# Patient Record
Sex: MALE | Race: WHITE | NOT HISPANIC OR LATINO | Employment: FULL TIME | URBAN - METROPOLITAN AREA
[De-identification: names, ages, dates, MRNs, and addresses within clinical notes are randomized per-mention and may not be internally consistent; named-entity substitution may affect disease eponyms.]

---

## 2017-12-13 ENCOUNTER — OFFICE VISIT (OUTPATIENT)
Dept: FAMILY MEDICINE CLINIC | Facility: CLINIC | Age: 32
End: 2017-12-13

## 2017-12-13 VITALS
HEIGHT: 68 IN | DIASTOLIC BLOOD PRESSURE: 70 MMHG | SYSTOLIC BLOOD PRESSURE: 105 MMHG | OXYGEN SATURATION: 98 % | BODY MASS INDEX: 36.93 KG/M2 | WEIGHT: 243.7 LBS | HEART RATE: 82 BPM

## 2017-12-13 DIAGNOSIS — E03.9 ACQUIRED HYPOTHYROIDISM: ICD-10-CM

## 2017-12-13 DIAGNOSIS — E78.5 HYPERLIPIDEMIA, UNSPECIFIED HYPERLIPIDEMIA TYPE: ICD-10-CM

## 2017-12-13 DIAGNOSIS — Z00.00 ANNUAL PHYSICAL EXAM: Primary | ICD-10-CM

## 2017-12-13 DIAGNOSIS — Z13.1 SCREENING FOR DIABETES MELLITUS: ICD-10-CM

## 2017-12-13 PROCEDURE — 99385 PREV VISIT NEW AGE 18-39: CPT | Performed by: FAMILY MEDICINE

## 2017-12-13 NOTE — PROGRESS NOTES
Subjective   Jadon Hanson Jr. is a 32 y.o. male. Patient is new here today for   Chief Complaint   Patient presents with   • Annual Exam     New to [facility]. Fasting.   • Hypothyroidism            Jadon Hanson Jr. presents for an Annual Wellness Visit.  he has a history of   Patient Active Problem List   Diagnosis   • Hypothyroidism   • Growth hormone deficiency   .      HPI     Hypothyroidism:  -diagnosed at age 15  -followed by Endocrinologist, Dr. Wilson  -ran out of thyroid medication about 1 month ago and hasn't been able to get to his usual pharmacy to  the refill due to car troubles  -feeling tired, gaining weight       Health Habits:  Dental Exam. not up to date - needed  Eye Exam. not up to date - pt does not wear corrective lenses  Exercise: 0 times/week.  Current exercise activities include: none   Would like to return to for fasting labs after he has resumed thyroid medication.    Pt declines STI screening.    Pt declines flu vaccine.    Tdap UTD.      The following portions of the patient's history were reviewed and updated as appropriate: allergies, current medications, past family history, past medical history, past social history, past surgical history and problem list.    Past Medical History:   Diagnosis Date   • Growth hormone deficiency    • Hyperlipidemia    • Hypothyroidism       Past Surgical History:   Procedure Laterality Date   • MOLE REMOVAL         No Known Allergies     Social History     Social History   • Marital status: Single     Social History Main Topics   • Smoking status: Never Smoker   • Smokeless tobacco: Never Used   • Alcohol use Yes      Comment: Socially, about 6-20 drinks per week.     • Drug use: No   • Sexual activity: Yes     Partners: Female     Social History Narrative    Lives with his brother but is planning to move in with his girlfriend soon.  Works as an .        Medications:  Nature thyroid 112 mg PO daily    Review of Systems    Constitutional: Positive for fatigue and unexpected weight change (gaining weight). Negative for appetite change and fever.   HENT: Negative for congestion, rhinorrhea and sore throat.    Eyes: Negative for pain and visual disturbance.   Respiratory: Negative for cough, chest tightness and shortness of breath.    Cardiovascular: Negative for chest pain, palpitations and leg swelling.   Gastrointestinal: Negative for abdominal pain, blood in stool, constipation, diarrhea, nausea and vomiting.   Genitourinary: Negative for discharge, genital sores and penile pain.   Musculoskeletal: Negative for arthralgias and myalgias.   Skin: Negative for pallor and rash.   Neurological: Negative for dizziness and headaches.   Hematological: Negative for adenopathy.   Psychiatric/Behavioral: Negative for dysphoric mood. The patient is not nervous/anxious.    All other systems reviewed and are negative.        Objective       Vitals:    12/13/17 1020   BP: 105/70   Pulse: 82   SpO2: 98%   BMI 37.8    Physical Exam   Constitutional: Vital signs are normal. He appears well-developed and well-nourished. No distress.   HENT:   Head: Normocephalic and atraumatic.   Nose: Nose normal.   Mouth/Throat: Oropharynx is clear and moist.   Eyes: Conjunctivae are normal. Pupils are equal, round, and reactive to light.   Neck: Normal range of motion. Neck supple. No thyromegaly present.   Cardiovascular: Normal rate, regular rhythm, S1 normal, S2 normal and normal heart sounds.  Exam reveals no gallop.    No murmur heard.  Pulses:       Radial pulses are 2+ on the right side, and 2+ on the left side.   Pulmonary/Chest: Effort normal and breath sounds normal. He has no wheezes. He has no rales. He exhibits no tenderness.   Abdominal: Soft. Bowel sounds are normal. He exhibits no distension. There is no hepatosplenomegaly. There is no tenderness. There is no rebound and no guarding.   Musculoskeletal: He exhibits no edema.   Lymphadenopathy:      He has no cervical adenopathy.   Neurological: He is alert. He has normal strength. Gait normal.   Skin: Skin is warm and dry. No cyanosis. Nails show no clubbing.   Psychiatric: He has a normal mood and affect. His speech is normal and behavior is normal.   Nursing note and vitals reviewed.      ASSESSMENT/PLAN       Problem List Items Addressed This Visit        Endocrine    Hypothyroidism    Relevant Medications    Refill Thyroid (THYROIDTHROID) 113.75 MG PO tablet, will send to nearby pharmacy so pt may walk to pick it up    Other Relevant Orders    TSH    T3, free    T4, free  Pt will return for fasting labs in 3 months  Continue to follow up with Endocrinology as scheduled      Other Visit Diagnoses     Annual physical exam    -  Primary    Relevant Medications    Thyroid (THYROIDTHROID) 113.75 MG PO tablet    Other Relevant Orders    Comprehensive Metabolic Panel    TSH    Lipid Panel With LDL / HDL Ratio    T3, free    T4, free  Pt advised to decrease ETOH consumption and increase exercise      Hyperlipidemia, unspecified hyperlipidemia type        Relevant Orders    Lipid Panel With LDL / HDL Ratio      Screening for diabetes mellitus        Relevant Orders    Comprehensive Metabolic Panel            Patient Instructions   Don't forget to schedule a dental exam soon.      Return to clinic for a fasting lab draw in 3 months' time.      Check your thyroid each month for nodules.    Try to exercise at least 3 times per week for 30 minutes per session.        Hypothyroidism  Hypothyroidism is a disorder of the thyroid. The thyroid is a large gland that is located in the lower front of the neck. The thyroid releases hormones that control how the body works. With hypothyroidism, the thyroid does not make enough of these hormones.  CAUSES  Causes of hypothyroidism may include:  · Viral infections.  · Pregnancy.  · Your own defense system (immune system) attacking your thyroid.  · Certain medicines.  · Birth  defects.  · Past radiation treatments to your head or neck.  · Past treatment with radioactive iodine.  · Past surgical removal of part or all of your thyroid.  · Problems with the gland that is located in the center of your brain (pituitary).  SIGNS AND SYMPTOMS  Signs and symptoms of hypothyroidism may include:  · Feeling as though you have no energy (lethargy).  · Inability to tolerate cold.  · Weight gain that is not explained by a change in diet or exercise habits.  · Dry skin.  · Coarse hair.  · Menstrual irregularity.  · Slowing of thought processes.  · Constipation.  · Sadness or depression.  DIAGNOSIS   Your health care provider may diagnose hypothyroidism with blood tests and ultrasound tests.  TREATMENT  Hypothyroidism is treated with medicine that replaces the hormones that your body does not make. After you begin treatment, it may take several weeks for symptoms to go away.  HOME CARE INSTRUCTIONS   · Take medicines only as directed by your health care provider.  · If you start taking any new medicines, tell your health care provider.  · Keep all follow-up visits as directed by your health care provider. This is important. As your condition improves, your dosage needs may change. You will need to have blood tests regularly so that your health care provider can watch your condition.  SEEK MEDICAL CARE IF:  · Your symptoms do not get better with treatment.  · You are taking thyroid replacement medicine and:    You sweat excessively.    You have tremors.    You feel anxious.    You lose weight rapidly.    You cannot tolerate heat.    You have emotional swings.    You have diarrhea.    You feel weak.  SEEK IMMEDIATE MEDICAL CARE IF:   · You develop chest pain.  · You develop an irregular heartbeat.  · You develop a rapid heartbeat.     This information is not intended to replace advice given to you by your health care provider. Make sure you discuss any questions you have with your health care provider.      Document Released: 12/18/2006 Document Revised: 01/08/2016 Document Reviewed: 05/05/2015  [employer] Interactive Patient Education ©2017 [employer]            Return in about 6 months (around 6/13/2018).

## 2017-12-13 NOTE — PATIENT INSTRUCTIONS
Don't forget to schedule a dental exam soon.      Return to clinic for a fasting lab draw in 3 months' time.      Check your thyroid each month for nodules.    Try to exercise at least 3 times per week for 30 minutes per session.        Hypothyroidism  Hypothyroidism is a disorder of the thyroid. The thyroid is a large gland that is located in the lower front of the neck. The thyroid releases hormones that control how the body works. With hypothyroidism, the thyroid does not make enough of these hormones.  CAUSES  Causes of hypothyroidism may include:  · Viral infections.  · Pregnancy.  · Your own defense system (immune system) attacking your thyroid.  · Certain medicines.  · Birth defects.  · Past radiation treatments to your head or neck.  · Past treatment with radioactive iodine.  · Past surgical removal of part or all of your thyroid.  · Problems with the gland that is located in the center of your brain (pituitary).  SIGNS AND SYMPTOMS  Signs and symptoms of hypothyroidism may include:  · Feeling as though you have no energy (lethargy).  · Inability to tolerate cold.  · Weight gain that is not explained by a change in diet or exercise habits.  · Dry skin.  · Coarse hair.  · Menstrual irregularity.  · Slowing of thought processes.  · Constipation.  · Sadness or depression.  DIAGNOSIS   Your health care provider may diagnose hypothyroidism with blood tests and ultrasound tests.  TREATMENT  Hypothyroidism is treated with medicine that replaces the hormones that your body does not make. After you begin treatment, it may take several weeks for symptoms to go away.  HOME CARE INSTRUCTIONS   · Take medicines only as directed by your health care provider.  · If you start taking any new medicines, tell your health care provider.  · Keep all follow-up visits as directed by your health care provider. This is important. As your condition improves, your dosage needs may change. You will need to have blood tests regularly so  that your health care provider can watch your condition.  SEEK MEDICAL CARE IF:  · Your symptoms do not get better with treatment.  · You are taking thyroid replacement medicine and:    You sweat excessively.    You have tremors.    You feel anxious.    You lose weight rapidly.    You cannot tolerate heat.    You have emotional swings.    You have diarrhea.    You feel weak.  SEEK IMMEDIATE MEDICAL CARE IF:   · You develop chest pain.  · You develop an irregular heartbeat.  · You develop a rapid heartbeat.     This information is not intended to replace advice given to you by your health care provider. Make sure you discuss any questions you have with your health care provider.     Document Released: 12/18/2006 Document Revised: 01/08/2016 Document Reviewed: 05/05/2015  [employer] Interactive Patient Education ©2017 [employer]

## 2018-01-19 DIAGNOSIS — Z00.00 ANNUAL PHYSICAL EXAM: ICD-10-CM

## 2018-01-22 RX ORDER — THYROID,PORK 113.75 MG
TABLET ORAL
Qty: 30 TABLET | Refills: 2 | Status: SHIPPED | OUTPATIENT
Start: 2018-01-22 | End: 2018-08-08 | Stop reason: SDUPTHER

## 2018-03-13 ENCOUNTER — LAB (OUTPATIENT)
Dept: FAMILY MEDICINE CLINIC | Facility: CLINIC | Age: 33
End: 2018-03-13

## 2018-03-13 DIAGNOSIS — Z00.00 ANNUAL PHYSICAL EXAM: ICD-10-CM

## 2018-03-13 DIAGNOSIS — E78.5 HYPERLIPIDEMIA, UNSPECIFIED HYPERLIPIDEMIA TYPE: ICD-10-CM

## 2018-03-13 DIAGNOSIS — Z13.1 SCREENING FOR DIABETES MELLITUS: ICD-10-CM

## 2018-03-13 DIAGNOSIS — E03.9 ACQUIRED HYPOTHYROIDISM: ICD-10-CM

## 2018-07-16 DIAGNOSIS — E55.9 VITAMIN D DEFICIENCY: ICD-10-CM

## 2018-07-16 DIAGNOSIS — E03.9 HYPOTHYROIDISM, UNSPECIFIED TYPE: Primary | ICD-10-CM

## 2018-07-21 ENCOUNTER — RESULTS ENCOUNTER (OUTPATIENT)
Dept: FAMILY MEDICINE CLINIC | Facility: CLINIC | Age: 33
End: 2018-07-21

## 2018-07-21 DIAGNOSIS — E55.9 VITAMIN D DEFICIENCY: ICD-10-CM

## 2018-07-21 DIAGNOSIS — E03.9 HYPOTHYROIDISM, UNSPECIFIED TYPE: ICD-10-CM

## 2018-08-08 ENCOUNTER — OFFICE VISIT (OUTPATIENT)
Dept: FAMILY MEDICINE CLINIC | Facility: CLINIC | Age: 33
End: 2018-08-08

## 2018-08-08 VITALS
WEIGHT: 246 LBS | BODY MASS INDEX: 37.28 KG/M2 | DIASTOLIC BLOOD PRESSURE: 86 MMHG | HEIGHT: 68 IN | OXYGEN SATURATION: 98 % | RESPIRATION RATE: 13 BRPM | SYSTOLIC BLOOD PRESSURE: 124 MMHG | HEART RATE: 78 BPM

## 2018-08-08 DIAGNOSIS — E03.9 HYPOTHYROIDISM, UNSPECIFIED TYPE: Primary | ICD-10-CM

## 2018-08-08 DIAGNOSIS — E78.5 HYPERLIPIDEMIA, UNSPECIFIED HYPERLIPIDEMIA TYPE: ICD-10-CM

## 2018-08-08 PROCEDURE — 99213 OFFICE O/P EST LOW 20 MIN: CPT | Performed by: FAMILY MEDICINE

## 2018-08-08 NOTE — PATIENT INSTRUCTIONS
"Try to exercise at least 3 times per week for 30 minutes per session.    Try over the counter Flonase or Nasacort for sinus pressure.      Food Choices to Lower Your Triglycerides  Triglycerides are a type of fat in your blood. High levels of triglycerides can increase the risk of heart disease and stroke. If your triglyceride levels are high, the foods you eat and your eating habits are very important. Choosing the right foods can help lower your triglycerides.  What general guidelines do I need to follow?  · Lose weight if you are overweight.  · Limit or avoid alcohol.  · Fill one half of your plate with vegetables and green salads.  · Limit fruit to two servings a day. Choose fruit instead of juice.  · Make one fourth of your plate whole grains. Look for the word \"whole\" as the first word in the ingredient list.  · Fill one fourth of your plate with lean protein foods.  · Enjoy fatty fish (such as salmon, mackerel, sardines, and tuna) three times a week.  · Choose healthy fats.  · Limit foods high in starch and sugar.  · Eat more home-cooked food and less restaurant, buffet, and fast food.  · Limit fried foods.  · Cook foods using methods other than frying.  · Limit saturated fats.  · Check ingredient lists to avoid foods with partially hydrogenated oils (trans fats) in them.  What foods can I eat?  Grains  Whole grains, such as whole wheat or whole grain breads, crackers, cereals, and pasta. Unsweetened oatmeal, bulgur, barley, quinoa, or brown rice. Corn or whole wheat flour tortillas.  Vegetables  Fresh or frozen vegetables (raw, steamed, roasted, or grilled). Green salads.  Fruits  All fresh, canned (in natural juice), or frozen fruits.  Meat and Other Protein Products  Ground beef (85% or leaner), grass-fed beef, or beef trimmed of fat. Skinless chicken or turkey. Ground chicken or turkey. Pork trimmed of fat. All fish and seafood. Eggs. Dried beans, peas, or lentils. Unsalted nuts or seeds. Unsalted canned " or dry beans.  Dairy  Low-fat dairy products, such as skim or 1% milk, 2% or reduced-fat cheeses, low-fat ricotta or cottage cheese, or plain low-fat yogurt.  Fats and Oils  Tub margarines without trans fats. Light or reduced-fat mayonnaise and salad dressings. Avocado. Safflower, olive, or canola oils. Natural peanut or almond butter.  The items listed above may not be a complete list of recommended foods or beverages. Contact your dietitian for more options.  What foods are not recommended?  Grains  White bread. White pasta. White rice. Cornbread. Bagels, pastries, and croissants. Crackers that contain trans fat.  Vegetables  White potatoes. Corn. Creamed or fried vegetables. Vegetables in a cheese sauce.  Fruits  Dried fruits. Canned fruit in light or heavy syrup. Fruit juice.  Meat and Other Protein Products  Fatty cuts of meat. Ribs, chicken wings, harding, sausage, bologna, salami, chitterlings, fatback, hot dogs, bratwurst, and packaged luncheon meats.  Dairy  Whole or 2% milk, cream, half-and-half, and cream cheese. Whole-fat or sweetened yogurt. Full-fat cheeses. Nondairy creamers and whipped toppings. Processed cheese, cheese spreads, or cheese curds.  Sweets and Desserts  Corn syrup, sugars, honey, and molasses. Candy. Jam and jelly. Syrup. Sweetened cereals. Cookies, pies, cakes, donuts, muffins, and ice cream.  Fats and Oils  Butter, stick margarine, lard, shortening, ghee, or harding fat. Coconut, palm kernel, or palm oils.  Beverages  Alcohol. Sweetened drinks (such as sodas, lemonade, and fruit drinks or punches).  The items listed above may not be a complete list of foods and beverages to avoid. Contact your dietitian for more information.  This information is not intended to replace advice given to you by your health care provider. Make sure you discuss any questions you have with your health care provider.  Document Released: 10/05/2005 Document Revised: 05/25/2017 Document Reviewed:  10/22/2014  [employer] Interactive Patient Education © 2017 [employer]

## 2018-08-08 NOTE — PROGRESS NOTES
Subjective   Jadon Hanson Jr. is a 32 y.o. male.     Chief Complaint   Patient presents with   • Hypothyroidism     Follow up   • Hyperlipidemia       HPI        Hypothyroidism:  -taking thyroid 113.75 mg per day  -feeling well--no fatigue, rapid weight changes, tachycardia, or goiter  -TSH 1.86 with normal free T3 and free T4 on recent labs (please see scanned copy)  -planning to re-establish care with his endocrinologist, Dr. Wilson, soon but requests a medication refill to get him through until he can do so    HLD:  -, HDL 45, total cholesterol 187, and triglycerides 80 on 8/6/18  -CMP normal at that time  -no regular exercise  -diet could be better    Review of Systems   Constitutional: Negative for fatigue and fever.   HENT: Positive for congestion (chronic) and sinus pressure (chronic). Negative for sore throat and trouble swallowing.    Respiratory: Negative for cough and shortness of breath.    Cardiovascular: Negative for chest pain, palpitations and leg swelling.   Gastrointestinal: Negative for abdominal pain and nausea.   Neurological: Negative for dizziness and headaches.   Psychiatric/Behavioral: Negative for dysphoric mood. The patient is not nervous/anxious.        The following portions of the patient's history were reviewed and updated as appropriate: allergies, current medications, past family history, past medical history, past social history, past surgical history and problem list.    Past Medical History:   Diagnosis Date   • Deviated septum    • Growth hormone deficiency (CMS/HCC)    • Hyperlipidemia    • Hypothyroidism      Social History     Social History   • Marital status: Single     Social History Main Topics   • Smoking status: Never Smoker   • Smokeless tobacco: Never Used   • Alcohol use Yes      Comment: Socially, about 6-20 drinks per week.     • Drug use: No   • Sexual activity: Yes     Partners: Female     Social History Narrative    Lives with his brother but is  planning to move in with his girlfriend soon.  Works as an .          No Known Allergies     Outpatient Medications Prior to Visit   Medication Sig Dispense Refill   • THYROIDTHROID 113.75 MG tablet TAKE 1 TABLET BY MOUTH DAILY 30 tablet 2     No facility-administered medications prior to visit.        Objective     Vitals:    08/08/18 0946   BP: 124/86   Pulse: 78   Resp: 13   SpO2: 98%     BMI 37    Physical Exam   Constitutional: Vital signs are normal. He appears well-developed and well-nourished. No distress.   HENT:   Head: Normocephalic and atraumatic.   Neck: No thyromegaly present.   Cardiovascular: Normal rate, regular rhythm, S1 normal, S2 normal and normal heart sounds.  Exam reveals no gallop.    No murmur heard.  Pulmonary/Chest: Effort normal and breath sounds normal. He has no wheezes. He has no rales. He exhibits no tenderness.   Lymphadenopathy:     He has no cervical adenopathy.   Skin: Skin is warm and dry. No cyanosis. Nails show no clubbing.   Psychiatric: He has a normal mood and affect. His speech is normal and behavior is normal.   Nursing note and vitals reviewed.      ASSESSMENT/PLAN       Problem List Items Addressed This Visit        Endocrine    Hypothyroidism - Primary  well controlled, continue current medication regimen    Relevant Medications    Refill Thyroid (THYROIDTHROID) 113.75 MG PO tablet      Other Visit Diagnoses     Hyperlipidemia, unspecified hyperlipidemia type      Pt counseled on diet exercise modifications as below            Patient Instructions   Try to exercise at least 3 times per week for 30 minutes per session.    Try over the counter Flonase or Nasacort for sinus pressure.      Food Choices to Lower Your Triglycerides  Triglycerides are a type of fat in your blood. High levels of triglycerides can increase the risk of heart disease and stroke. If your triglyceride levels are high, the foods you eat and your eating habits are very important. Choosing the  "right foods can help lower your triglycerides.  What general guidelines do I need to follow?  · Lose weight if you are overweight.  · Limit or avoid alcohol.  · Fill one half of your plate with vegetables and green salads.  · Limit fruit to two servings a day. Choose fruit instead of juice.  · Make one fourth of your plate whole grains. Look for the word \"whole\" as the first word in the ingredient list.  · Fill one fourth of your plate with lean protein foods.  · Enjoy fatty fish (such as salmon, mackerel, sardines, and tuna) three times a week.  · Choose healthy fats.  · Limit foods high in starch and sugar.  · Eat more home-cooked food and less restaurant, buffet, and fast food.  · Limit fried foods.  · Cook foods using methods other than frying.  · Limit saturated fats.  · Check ingredient lists to avoid foods with partially hydrogenated oils (trans fats) in them.  What foods can I eat?  Grains  Whole grains, such as whole wheat or whole grain breads, crackers, cereals, and pasta. Unsweetened oatmeal, bulgur, barley, quinoa, or brown rice. Corn or whole wheat flour tortillas.  Vegetables  Fresh or frozen vegetables (raw, steamed, roasted, or grilled). Green salads.  Fruits  All fresh, canned (in natural juice), or frozen fruits.  Meat and Other Protein Products  Ground beef (85% or leaner), grass-fed beef, or beef trimmed of fat. Skinless chicken or turkey. Ground chicken or turkey. Pork trimmed of fat. All fish and seafood. Eggs. Dried beans, peas, or lentils. Unsalted nuts or seeds. Unsalted canned or dry beans.  Dairy  Low-fat dairy products, such as skim or 1% milk, 2% or reduced-fat cheeses, low-fat ricotta or cottage cheese, or plain low-fat yogurt.  Fats and Oils  Tub margarines without trans fats. Light or reduced-fat mayonnaise and salad dressings. Avocado. Safflower, olive, or canola oils. Natural peanut or almond butter.  The items listed above may not be a complete list of recommended foods or " beverages. Contact your dietitian for more options.  What foods are not recommended?  Grains  White bread. White pasta. White rice. Cornbread. Bagels, pastries, and croissants. Crackers that contain trans fat.  Vegetables  White potatoes. Corn. Creamed or fried vegetables. Vegetables in a cheese sauce.  Fruits  Dried fruits. Canned fruit in light or heavy syrup. Fruit juice.  Meat and Other Protein Products  Fatty cuts of meat. Ribs, chicken wings, harding, sausage, bologna, salami, chitterlings, fatback, hot dogs, bratwurst, and packaged luncheon meats.  Dairy  Whole or 2% milk, cream, half-and-half, and cream cheese. Whole-fat or sweetened yogurt. Full-fat cheeses. Nondairy creamers and whipped toppings. Processed cheese, cheese spreads, or cheese curds.  Sweets and Desserts  Corn syrup, sugars, honey, and molasses. Candy. Jam and jelly. Syrup. Sweetened cereals. Cookies, pies, cakes, donuts, muffins, and ice cream.  Fats and Oils  Butter, stick margarine, lard, shortening, ghee, or harding fat. Coconut, palm kernel, or palm oils.  Beverages  Alcohol. Sweetened drinks (such as sodas, lemonade, and fruit drinks or punches).  The items listed above may not be a complete list of foods and beverages to avoid. Contact your dietitian for more information.  This information is not intended to replace advice given to you by your health care provider. Make sure you discuss any questions you have with your health care provider.  Document Released: 10/05/2005 Document Revised: 05/25/2017 Document Reviewed: 10/22/2014  [employer] Interactive Patient Education © 2017 [employer]      Return in about 4 months (around 12/14/2018) for Annual.      Melissa Betancourt MD  08/08/18

## 2018-08-26 PROBLEM — E78.5 HYPERLIPIDEMIA: Status: ACTIVE | Noted: 2018-08-26

## 2021-06-14 ENCOUNTER — OFFICE VISIT (OUTPATIENT)
Dept: INTERNAL MEDICINE | Facility: CLINIC | Age: 36
End: 2021-06-14

## 2021-06-14 VITALS
BODY MASS INDEX: 36.28 KG/M2 | DIASTOLIC BLOOD PRESSURE: 74 MMHG | OXYGEN SATURATION: 100 % | SYSTOLIC BLOOD PRESSURE: 116 MMHG | HEIGHT: 68 IN | HEART RATE: 60 BPM | WEIGHT: 239.4 LBS

## 2021-06-14 DIAGNOSIS — E23.0 GROWTH HORMONE DEFICIENCY (HCC): ICD-10-CM

## 2021-06-14 DIAGNOSIS — E66.9 OBESITY, CLASS II, BMI 35-39.9: ICD-10-CM

## 2021-06-14 DIAGNOSIS — E03.9 HYPOTHYROIDISM, UNSPECIFIED TYPE: ICD-10-CM

## 2021-06-14 DIAGNOSIS — E78.5 HYPERLIPIDEMIA, UNSPECIFIED HYPERLIPIDEMIA TYPE: ICD-10-CM

## 2021-06-14 DIAGNOSIS — Z00.00 ANNUAL PHYSICAL EXAM: Primary | ICD-10-CM

## 2021-06-14 PROBLEM — E66.812 OBESITY, CLASS II, BMI 35-39.9: Status: ACTIVE | Noted: 2021-06-14

## 2021-06-14 PROCEDURE — 99385 PREV VISIT NEW AGE 18-39: CPT | Performed by: FAMILY MEDICINE

## 2021-06-14 RX ORDER — LEVOTHYROXINE, LIOTHYRONINE 57; 13.5 UG/1; UG/1
90 TABLET ORAL DAILY
COMMUNITY
Start: 2021-05-10 | End: 2022-07-11 | Stop reason: SDUPTHER

## 2021-06-15 NOTE — PROGRESS NOTES
Date of Encounter: 2021  Patient: Jadon Hanson Jr.,  1985    Subjective   History of Presenting Illness  Chief complaint: Annual physical    Hypothyroidism, since childhood, recently on stable dose of desiccated animal preparation.  Follows with endocrinology for management.    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  Again being monitored by endocrinology Dr. Kathy Wilson.    Deviated septum with no significant symptoms including recurrent sinusitis.    BMI greater than 35, has successfully lost weight in the past with carb restriction just needs to decide to start these changes.    Lives with wife.  No children.  Never smoker.  6-10 alcoholic beverages per week.  Exercises 3 times per week by lifting weights.  Current diet described as poor, goal is to improve it with carbohydrate restriction.  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  Up-to-date on COVID-19 vaccination.    Review of Systems:  Negative for fever, congestion, chest pain upon exertion, shortness of breath, vision changes, vomiting, dysuria, lymphadenopathy, muscle weakness, numbness, mood changes, rashes.    The following portions of the patient's history were reviewed and updated as appropriate: allergies, current medications, past family history, past medical history, past social history, past surgical history and problem list.    Patient Active Problem List   Diagnosis   • Hypothyroidism   • Growth hormone deficiency (CMS/HCC)   • Deviated septum   • Hyperlipidemia     Past Medical History:   Diagnosis Date   • Deviated septum    • Growth hormone deficiency (CMS/HCC)    • Hyperlipidemia    • Hypothyroidism      Past Surgical History:   Procedure Laterality Date   • MOLE REMOVAL       Family History   Problem Relation Age of Onset   • Diabetes Father    • Food intolerance Mother        Current Outpatient Medications:   •  NP Thyroid 90 MG  "tablet, Take 90 mg by mouth Daily., Disp: , Rfl:   No Known Allergies  Social History     Tobacco Use   • Smoking status: Never Smoker   • Smokeless tobacco: Never Used   Substance Use Topics   • Alcohol use: Yes     Comment: Socially, about 6-20 drinks per week.     • Drug use: Never          Objective   Physical Exam  Vitals:    06/14/21 1547   BP: 116/74   Pulse: 60   SpO2: 100%   Weight: 109 kg (239 lb 6.4 oz)   Height: 172.7 cm (68\")     Body mass index is 36.4 kg/m².    Constitutional: NAD.  Eyes: EOMI. PERRLA. Normal conjunctiva.  Ear, nose, mouth, throat: No tonsillar exudates or erythema.   Normal nasal mucosa. Normal external ear canals and TMs bilaterally.  Cardiovascular: RRR. No murmurs. No LE edema b/l. Radial pulses 2+ bilaterally.  Pulmonary: CTA b/l. Good effort.  Integumentary: No rashes or wounds on face or upper extremities.  Lymphatic: No anterior cervical lymphadenopathy.  Endocrine: No thyromegaly or palpable thyroid nodules.  Psychiatric: Normal affect. Normal thought content.  Gastrointestinal: Nondistended. No hepatosplenomegaly. No focal tenderness to palpation. Normal bowel sounds.     Assessment/Plan   Assessment and Plan  Pleasant 35-year-old male with history of growth hormone deficiency, hypothyroidism, hyperlipidemia, BMI greater than 35, who presents with the following:    Diagnoses and all orders for this visit:    1. Annual physical exam (Primary):  We discussed preventative care including age and patient-appropriate immunizations and cancer screening. We also discussed the importance of exercise and a healthy diet. This is their annual preventative exam.    2. Hyperlipidemia, unspecified hyperlipidemia type  3. Hypothyroidism, unspecified type  4. Growth hormone deficiency (CMS/HCC)    Patient has these labs monitored through endocrinology Dr. Kathy Wilson.  We will request this blood work for review and determine any gaps needed although it does not sound like statin therapy has " been indicated, and it appears his hypothyroidism is well controlled.    He hopes to get blood work through our lab as it is cheaper than endocrinology, okay with me, happy to place orders as needed.    5. Obesity, Class II, BMI 35-39.9: Has successfully lost weight in the past with carbohydrate restriction, encouraged him to consider this this year.    Return to office in 1 year for annual physical or earlier as needed.  Diego Rincon MD  Family Medicine  O: [phone]  C: [phone]    Disclaimer: Parts of this note were dictated by speech recognition. Minor errors in transcription may be present. Please call if questions.

## 2022-05-26 ENCOUNTER — OFFICE VISIT (OUTPATIENT)
Dept: INTERNAL MEDICINE | Facility: CLINIC | Age: 37
End: 2022-05-26

## 2022-05-26 VITALS
HEIGHT: 68 IN | OXYGEN SATURATION: 99 % | BODY MASS INDEX: 39.01 KG/M2 | WEIGHT: 257.4 LBS | HEART RATE: 72 BPM | SYSTOLIC BLOOD PRESSURE: 114 MMHG | DIASTOLIC BLOOD PRESSURE: 82 MMHG

## 2022-05-26 DIAGNOSIS — E66.9 OBESITY, CLASS II, BMI 35-39.9: ICD-10-CM

## 2022-05-26 DIAGNOSIS — Z00.00 ANNUAL PHYSICAL EXAM: Primary | ICD-10-CM

## 2022-05-26 DIAGNOSIS — E03.9 HYPOTHYROIDISM, UNSPECIFIED TYPE: ICD-10-CM

## 2022-05-26 DIAGNOSIS — J34.2 DEVIATED SEPTUM: ICD-10-CM

## 2022-05-26 DIAGNOSIS — E23.0 GROWTH HORMONE DEFICIENCY: ICD-10-CM

## 2022-05-26 DIAGNOSIS — E78.5 HYPERLIPIDEMIA, UNSPECIFIED HYPERLIPIDEMIA TYPE: ICD-10-CM

## 2022-05-26 PROCEDURE — 99395 PREV VISIT EST AGE 18-39: CPT | Performed by: FAMILY MEDICINE

## 2022-05-26 NOTE — PROGRESS NOTES
Date of Encounter: 2022  Patient: Jadon Hanson Jr.,  1985    Subjective   History of Presenting Illness  Chief complaint: Annual physical    No acute concerns.    Hypothyroidism since childhood, currently follows endocrinology for management.  He will need labs done through our office for them and would let me know what those orders are so we can arrange this.  No symptoms of uncontrolled hypothyroidism.    Hyperlipidemia with no indication for statin medication.    Growth hormone deficiency being followed by endocrinology as well.  Not currently on medications for this.    Deviated septum with no significant symptoms including recurrent sinusitis.     BMI greater than 35 with some weight gain since her last appointment.  He has been working over 70-hour weeks as an  due to increased workload and has not been able to exercise regularly or eat very healthy.  He does know how to lose weight successfully and is not currently interested in medications to help with this.     Lives with wife. No children.  Never smoker.  6-10 alcoholic beverages per week.  Was getting exercise through [employer], not currently exercising.  Current diet described as poor.  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  Up-to-date on COVID-19, Td vaccination.    Review of Systems:  Negative for fever, congestion, chest pain upon exertion, shortness of breath, vision changes, vomiting, dysuria, lymphadenopathy, muscle weakness, numbness, mood changes, rashes.    The following portions of the patient's history were reviewed and updated as appropriate: allergies, current medications, past family history, past medical history, past social history, past surgical history and problem list.    Patient Active Problem List   Diagnosis   • Hypothyroidism   • Growth hormone deficiency (HCC)   • Deviated septum   • Hyperlipidemia   • Obesity, Class II, BMI 35-39.9     Past Medical History:   Diagnosis Date  "  • Deviated septum    • Growth hormone deficiency (HCC)    • Hyperlipidemia    • Hypothyroidism      Past Surgical History:   Procedure Laterality Date   • MOLE REMOVAL       Family History   Problem Relation Age of Onset   • Diabetes Father    • Food intolerance Mother        Current Outpatient Medications:   •  NP Thyroid 90 MG tablet, Take 90 mg by mouth Daily., Disp: , Rfl:   No Known Allergies  Social History     Tobacco Use   • Smoking status: Never Smoker   • Smokeless tobacco: Never Used   Substance Use Topics   • Alcohol use: Yes     Comment: Socially, about 6-20 drinks per week.     • Drug use: Never          Objective   Physical Exam  Vitals:    05/26/22 1242   BP: 114/82   Pulse: 72   SpO2: 99%   Weight: 117 kg (257 lb 6.4 oz)   Height: 172.7 cm (68\")     Body mass index is 39.14 kg/m².    Constitutional: NAD.  Eyes: EOMI. PERRLA. Normal conjunctiva.  Ear, nose, mouth, throat: No tonsillar exudates or erythema.   Normal nasal mucosa. Normal external ear canals and TMs bilaterally.  Cardiovascular: RRR. No murmurs. No LE edema b/l. Radial pulses 2+ bilaterally.  Pulmonary: CTA b/l. Good effort.  Integumentary: No rashes or wounds on face or upper extremities.  Lymphatic: No anterior cervical lymphadenopathy.  Endocrine: No thyromegaly or palpable thyroid nodules.  Psychiatric: Normal affect. Normal thought content.     Assessment & Plan   Assessment and Plan  Pleasant 36-year-old male with history of growth hormone deficiency, hypothyroidism, hyperlipidemia, BMI greater than 35, who presents with the following:    Diagnoses and all orders for this visit:    1. Annual physical exam (Primary): We discussed preventative care including age and patient-appropriate immunizations and cancer screening. We also discussed the importance of exercise and a healthy diet. This is their annual preventative exam.    2. Hypothyroidism, unspecified type  3. Hyperlipidemia, unspecified hyperlipidemia type  4. Growth " hormone deficiency (HCC)    Patient to work with endocrinology to get lab order so we can arrange lab work at our office.  I will get any gap labs needed as well.    5. Deviated septum: Asymptomatic    6. Obesity, Class II, BMI 35-39.9: Discussed weight loss and weight loss medications.  He is not currently interested in these right now.    Return to office in 1 year for annual physical or earlier as needed.    Diego Rincon MD  Family Medicine  O: [phone]  C: [phone]    Disclaimer: Parts of this note were dictated by speech recognition. Minor errors in transcription may be present. Please call if questions.

## 2022-07-11 ENCOUNTER — TELEPHONE (OUTPATIENT)
Dept: INTERNAL MEDICINE | Facility: CLINIC | Age: 37
End: 2022-07-11

## 2022-07-11 RX ORDER — LEVOTHYROXINE, LIOTHYRONINE 57; 13.5 UG/1; UG/1
90 TABLET ORAL DAILY
Qty: 90 TABLET | Refills: 3 | Status: SHIPPED | OUTPATIENT
Start: 2022-07-11

## 2022-07-11 NOTE — TELEPHONE ENCOUNTER
Caller: Jadon Hanson Jr.    Relationship: Self    Best call back number: [phone]  Requested Prescriptions:   Requested Prescriptions     Pending Prescriptions Disp Refills   • NP Thyroid 90 MG tablet       Sig: Take 1 tablet by mouth Daily.        Pharmacy where request should be sent: [address] [phone] [facility] [phone] FX     Additional details provided by patient: PATIENT WAS TOLD IF HE HAD ANY ISSUES WITH THE DOCTOR THAT PRESCRIBES THIS THAT HE CAN CONTACT  AND HE WOULD FILL IT. PATIENT IS COMPLETLEY OUT     Does the patient have less than a 3 day supply:  [x] Yes  [] No    Artur Sanders Rep   07/11/22 16:14 EDT

## 2023-02-23 ENCOUNTER — TELEPHONE (OUTPATIENT)
Dept: INTERNAL MEDICINE | Facility: CLINIC | Age: 38
End: 2023-02-23
Payer: COMMERCIAL

## 2023-02-23 DIAGNOSIS — E03.9 HYPOTHYROIDISM, UNSPECIFIED TYPE: ICD-10-CM

## 2023-02-23 DIAGNOSIS — Z13.89 SCREENING FOR BLOOD OR PROTEIN IN URINE: ICD-10-CM

## 2023-02-23 DIAGNOSIS — E78.5 HYPERLIPIDEMIA, UNSPECIFIED HYPERLIPIDEMIA TYPE: ICD-10-CM

## 2023-02-23 DIAGNOSIS — E66.9 OBESITY, CLASS II, BMI 35-39.9: ICD-10-CM

## 2023-02-23 NOTE — TELEPHONE ENCOUNTER
Caller: Jadon Hanson Jr.    Relationship: Self    Best call back number: [phone]    What orders are you requesting (i.e. lab or imaging): LABS    In what timeframe would the patient need to come in: FOR PHYSICAL APPOINTMENT ON 05/30/23    Where will you receive your lab/imaging services: IN OFFICE    Additional notes: PLEASE CALL TO SCHEDULE.    PATIENT STATED HE WILL ALSO BE SENDING OVER A BLOOD WORK REQUEST THAT HIS ENDOCRINOLOGIST WOULD LIKE FOR HIM TO HAVE DONE.

## 2023-05-30 ENCOUNTER — OFFICE VISIT (OUTPATIENT)
Dept: INTERNAL MEDICINE | Facility: CLINIC | Age: 38
End: 2023-05-30

## 2023-05-30 VITALS
SYSTOLIC BLOOD PRESSURE: 120 MMHG | TEMPERATURE: 96.9 F | WEIGHT: 257.8 LBS | HEART RATE: 63 BPM | DIASTOLIC BLOOD PRESSURE: 88 MMHG | HEIGHT: 68 IN | BODY MASS INDEX: 39.07 KG/M2 | OXYGEN SATURATION: 98 %

## 2023-05-30 DIAGNOSIS — J34.2 DEVIATED SEPTUM: ICD-10-CM

## 2023-05-30 DIAGNOSIS — E03.9 HYPOTHYROIDISM, UNSPECIFIED TYPE: ICD-10-CM

## 2023-05-30 DIAGNOSIS — E23.0 GROWTH HORMONE DEFICIENCY: ICD-10-CM

## 2023-05-30 DIAGNOSIS — E78.5 HYPERLIPIDEMIA, UNSPECIFIED HYPERLIPIDEMIA TYPE: ICD-10-CM

## 2023-05-30 DIAGNOSIS — Z23 NEED FOR TD VACCINE: ICD-10-CM

## 2023-05-30 DIAGNOSIS — E66.9 OBESITY, CLASS II, BMI 35-39.9: ICD-10-CM

## 2023-05-30 DIAGNOSIS — Z00.00 ANNUAL PHYSICAL EXAM: Primary | ICD-10-CM

## 2023-05-30 NOTE — PROGRESS NOTES
Date of Encounter: 2023  Patient: Jadon Hanson Jr.,  1985    Subjective   History of Presenting Illness  Chief complaint: Annual physical     No acute concerns.    Hypothyroidism since childhood, currently follows endocrinology for management.  He has not had any recent thyroid symptoms.  Labs from  demonstrate normal TSH, normal free T4 , normal reverse T3, free T3 elevated by 0.1 pg/mL from reference range.     Hyperlipidemia with no indication for statin medication.     History of growth hormone deficiency being followed by endocrinology as well.  Not currently on medications for this.     Deviated septum with typical allergy symptoms not needing medications. no history of frequent sinusitis     BMI greater than 35.  He has not been able to successfully lose weight with lifestyle changes.      Lives with wife. No children.  Never smoker.  6-10 alcoholic beverages per week. 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  Diet is average, does need to reduce his red meat intake, does not drink sugared beverages regularly.  Works as an  in litigation.  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  Amenable to Td vaccine, discussed COVID bivalent    Review of Systems:  Negative for fever, congestion, chest pain upon exertion, shortness of breath, vision changes, vomiting, dysuria, lymphadenopathy, muscle weakness, numbness, mood changes, rashes.    The following portions of the patient's history were reviewed and updated as appropriate: allergies, current medications, past family history, past medical history, past social history, past surgical history and problem list.    Patient Active Problem List   Diagnosis   • Hypothyroidism   • Growth hormone deficiency   • Deviated septum   • Hyperlipidemia   • Obesity, Class II, BMI 35-39.9     Past Medical History:   Diagnosis Date   • Deviated septum    • Growth hormone deficiency    • Hyperlipidemia    • Hypothyroidism      Past  "Surgical History:   Procedure Laterality Date   • MOLE REMOVAL       Family History   Problem Relation Age of Onset   • Diabetes Father    • Food intolerance Mother        Current Outpatient Medications:   •  NP Thyroid 90 MG tablet, Take 1 tablet by mouth Daily., Disp: 90 tablet, Rfl: 3  •  Semaglutide-Weight Management 0.25 MG/0.5ML solution auto-injector, Inject 0.25 mg under the skin into the appropriate area as directed 1 (One) Time Per Week. If tolerating, call doctor after 1 month to increase dose, Disp: 2 mL, Rfl: 3  No Known Allergies  Social History     Tobacco Use   • Smoking status: Never   • Smokeless tobacco: Never   Substance Use Topics   • Alcohol use: Yes     Comment: Socially, about 6-20 drinks per week.     • Drug use: Never          Objective   Physical Exam  Vitals:    05/30/23 1535   BP: 120/88   BP Location: Left arm   Patient Position: Sitting   Cuff Size: Large Adult   Pulse: 63   Temp: 96.9 °F (36.1 °C)   TempSrc: Infrared   SpO2: 98%   Weight: 117 kg (257 lb 12.8 oz)   Height: 172.7 cm (68\")     Body mass index is 39.2 kg/m².    Constitutional: NAD.  Eyes: EOMI. PERRLA. Normal conjunctiva.  Ear, nose, mouth, throat: No tonsillar exudates or erythema.   Right-sided septal deviation with yellow, thin rhinorrhea.  No bogginess of the mucosa.  Left nare clear. normal external ear canals and TMs bilaterally.  Cardiovascular: RRR. No murmurs. No LE edema b/l. Radial pulses 2+ bilaterally.  Pulmonary: CTA b/l. Good effort.  Integumentary: No rashes or wounds on face or upper extremities.  Lymphatic: No anterior cervical lymphadenopathy.  Endocrine: No thyromegaly or palpable thyroid nodules.  Psychiatric: Normal affect. Normal thought content.  Gastrointestinal: Nondistended. No hepatosplenomegaly. No focal tenderness to palpation. Normal bowel sounds.     Assessment & Plan   Assessment and Plan  Pleasant 37-year-old male with history of growth hormone deficiency, hypothyroidism, hyperlipidemia, " BMI greater than 35, who presents with the following:    Diagnoses and all orders for this visit:    1. Annual physical exam (Primary): We discussed preventative care including age and patient-appropriate immunizations and cancer screening. We also discussed the importance of exercise and a healthy diet. This is their annual preventative exam.    2. Hypothyroidism, unspecified type: I do not suspect there will be a dose adjustment based upon labs but will follow with endocrinology for management    3. Hyperlipidemia, unspecified hyperlipidemia type: No indication for statin at this time, discussed dietary management    4. Obesity, Class II, BMI 35-39.9: Discussed risks and benefits of GLP-1.  We will send trial prescription to see if insurance will cover.  No history of pancreatitis and no significant family history of thyroid cancer or multiple endocrine neoplasia  -     Semaglutide-Weight Management 0.25 MG/0.5ML solution auto-injector; Inject 0.25 mg under the skin into the appropriate area as directed 1 (One) Time Per Week. If tolerating, call doctor after 1 month to increase dose  Dispense: 2 mL; Refill: 3    5. Deviated septum: Mild symptoms    6. Growth hormone deficiency: Followed by endocrinology    7. Need for Td vaccine  -     Td Vaccine Greater Than or Equal To 6yo Preservative Free IM    Return to office in 1 year for annual physical or earlier as needed.    Diego Rincon MD  Family Medicine  O: [phone]    Disclaimer: Parts of this note were dictated by speech recognition. Minor errors in transcription may be present. Please call if questions.

## 2023-05-31 ENCOUNTER — PRIOR AUTHORIZATION (OUTPATIENT)
Dept: INTERNAL MEDICINE | Facility: CLINIC | Age: 38
End: 2023-05-31

## 2023-05-31 NOTE — TELEPHONE ENCOUNTER
PA for Wegovy 0.25MG/0.5ML auto-injectors denied via [employer].    Per pt's insurance plan, Rx for Wegovy 0.25MG/0.5ML auto-injectors is a plan exclusion and will not be covered.

## 2023-10-24 RX ORDER — LEVOTHYROXINE, LIOTHYRONINE 57; 13.5 UG/1; UG/1
90 TABLET ORAL DAILY
Qty: 90 TABLET | Refills: 0 | Status: SHIPPED | OUTPATIENT
Start: 2023-10-24

## 2024-01-16 RX ORDER — LEVOTHYROXINE, LIOTHYRONINE 57; 13.5 UG/1; UG/1
90 TABLET ORAL DAILY
Qty: 90 TABLET | Refills: 0 | Status: SHIPPED | OUTPATIENT
Start: 2024-01-16

## 2024-05-31 RX ORDER — LEVOTHYROXINE, LIOTHYRONINE 57; 13.5 UG/1; UG/1
90 TABLET ORAL DAILY
Qty: 90 TABLET | Refills: 3 | Status: SHIPPED | OUTPATIENT
Start: 2024-05-31

## 2024-07-01 ENCOUNTER — OFFICE VISIT (OUTPATIENT)
Dept: INTERNAL MEDICINE | Facility: CLINIC | Age: 39
End: 2024-07-01
Payer: COMMERCIAL

## 2024-07-01 VITALS
SYSTOLIC BLOOD PRESSURE: 118 MMHG | HEART RATE: 66 BPM | TEMPERATURE: 97.9 F | OXYGEN SATURATION: 98 % | BODY MASS INDEX: 39.92 KG/M2 | HEIGHT: 68 IN | DIASTOLIC BLOOD PRESSURE: 80 MMHG | WEIGHT: 263.4 LBS

## 2024-07-01 DIAGNOSIS — Z13.89 SCREENING FOR BLOOD OR PROTEIN IN URINE: ICD-10-CM

## 2024-07-01 DIAGNOSIS — E66.9 OBESITY, CLASS II, BMI 35-39.9: ICD-10-CM

## 2024-07-01 DIAGNOSIS — Z00.00 ANNUAL PHYSICAL EXAM: Primary | ICD-10-CM

## 2024-07-01 DIAGNOSIS — E03.9 HYPOTHYROIDISM, UNSPECIFIED TYPE: ICD-10-CM

## 2024-07-01 DIAGNOSIS — E23.0 GROWTH HORMONE DEFICIENCY: ICD-10-CM

## 2024-07-01 DIAGNOSIS — E78.5 HYPERLIPIDEMIA, UNSPECIFIED HYPERLIPIDEMIA TYPE: ICD-10-CM

## 2024-07-01 PROCEDURE — 99395 PREV VISIT EST AGE 18-39: CPT | Performed by: FAMILY MEDICINE

## 2024-07-01 NOTE — PROGRESS NOTES
Date of Encounter: 2024  Patient: Jadon Hanson Jr.,  1985    Subjective   History of Presenting Illness  Chief complaint: Annual physical     No acute concerns.     Hypothyroidism since childhood.  Was being managed by endocrinology in context of growth hormone deficiency that has now resolved.  Now following with PCP.  Usually has symptoms when his thyroid is off but has never had those while on medication.  Recent dosing has been stable.     Hyperlipidemia with no current indication for statin medication.      Deviated septum without significant allergic rhinitis     BMI greater than 35.  We again discussed GLP-1's.  He is not interested in pursuing that at this time.      Lives with wife. No children.  Never smoker.  6-10 alcoholic beverages per week. Exercising by officiating lacross. Also plays some tennis. Diet is average, does need to reduce his red meat intake, does not drink sugared beverages regularly.  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 Discussed influenza, COVID-19 vaccine    The following portions of the patient's history were reviewed and updated as appropriate: allergies, current medications, past family history, past medical history, past social history, past surgical history and problem list.    Patient Active Problem List   Diagnosis    Hypothyroidism    Growth hormone deficiency    Deviated septum    Hyperlipidemia    Obesity, Class II, BMI 35-39.9     Past Medical History:   Diagnosis Date    Deviated septum     Growth hormone deficiency     Hyperlipidemia     Hypothyroidism      Past Surgical History:   Procedure Laterality Date    MOLE REMOVAL       Family History   Problem Relation Age of Onset    Diabetes Father     Food intolerance Mother        Current Outpatient Medications:     NP Thyroid 90 MG tablet, Take 1 tablet by mouth once daily, Disp: 90 tablet, Rfl: 3  No Known Allergies  Social History     Tobacco Use    Smoking status: Never     " Passive exposure: Never    Smokeless tobacco: Never   Vaping Use    Vaping status: Never Used   Substance Use Topics    Alcohol use: Yes     Comment: Socially, about 6-20 drinks per week.      Drug use: Never          Objective   Physical Exam  Vitals:    07/01/24 1240   BP: 118/80   BP Location: Left arm   Patient Position: Sitting   Cuff Size: Large Adult   Pulse: 66   Temp: 97.9 °F (36.6 °C)   TempSrc: Infrared   SpO2: 98%   Weight: 119 kg (263 lb 6.4 oz)   Height: 172.7 cm (68\")     Body mass index is 40.05 kg/m².    Constitutional: NAD.  Eyes: EOMI. PERRLA. Normal conjunctiva.  Ear, nose, mouth, throat: No tonsillar exudates or erythema.   Normal nasal mucosa. Normal external ear canals and TMs bilaterally.  Cardiovascular: RRR. No murmurs. No LE edema b/l. Radial pulses 2+ bilaterally.  Pulmonary: CTA b/l. Good effort.  Integumentary: No rashes or wounds on face or upper extremities.  Lymphatic: No anterior cervical lymphadenopathy.  Endocrine: No thyromegaly or palpable thyroid nodules.  Psychiatric: Normal affect. Normal thought content.  Gastrointestinal: Nondistended. No hepatosplenomegaly. No focal tenderness to palpation. Normal bowel sounds.     Assessment & Plan   Assessment and Plan  Pleasant 38-year-old male with hyperlipidemia, hypothyroidism, history of growth hormone deficiency, BMI greater than 35, who presents for the following:    Diagnoses and all orders for this visit:    1. Annual physical exam (Primary): We discussed preventative care including age and patient-appropriate immunizations and cancer screening. We also discussed the importance of exercise and a healthy diet. This is their annual preventative exam.    2. Hyperlipidemia, unspecified hyperlipidemia type: No current indication for statin medication  -     CBC & Differential; Future  -     Comprehensive Metabolic Panel; Future  -     Lipid Panel; Future  -     TSH; Future  -     T4, Free; Future  -     T3, Free; Future    3. " Hypothyroidism, unspecified type: Controlled    4.  History of growth hormone deficiency    5. Obesity, Class II, BMI 35-39.9: Discussed GLP-1 medications again, and compounding options for affordability since his insurance did not cover.  He is not interested in pursuing these yet.  -     Hemoglobin A1c; Future    6. Screening for blood or protein in urine  -     Urinalysis With Microscopic - Urine, Clean Catch; Future    Class 3 Severe Obesity (BMI >=40). Obesity-related health conditions include the following: dyslipidemias. Obesity is worsening. BMI is is above average; BMI management plan is completed. We discussed portion control, increasing exercise, and pharmacologic options including GLP-1 .    Return to office in 1 year for annual physical or earlier as needed.    Diego Rincon MD  Family Medicine  O: [phone]    Disclaimer: Parts of this note were dictated by speech recognition. Minor errors in transcription may be present. Please call if questions.

## 2025-01-13 RX ORDER — LEVOTHYROXINE, LIOTHYRONINE 57; 13.5 UG/1; UG/1
90 TABLET ORAL DAILY
Qty: 90 TABLET | Refills: 3 | Status: SHIPPED | OUTPATIENT
Start: 2025-01-13

## 2025-01-13 NOTE — TELEPHONE ENCOUNTER
Caller: BERKLEY TABITHA    Relationship: Other    Best call back number: [phone]      Requested Prescriptions:   Requested Prescriptions     Pending Prescriptions Disp Refills    NP Thyroid 90 MG tablet 90 tablet 3     Sig: Take 1 tablet by mouth Daily.      Pharmacy where request should be sent: [facility], [address] [phone] [facility] [phone] FX     Last office visit with prescribing clinician: 7/1/2024   Last telemedicine visit with prescribing clinician: Visit date not found   Next office visit with prescribing clinician: 7/7/2025     Additional details provided by patient: PLEASE ADVISE, PATIENT IS REQUESTING A 90 DAY SUPPLY OF THIS CALLED INTO [facility].     Does the patient have less than a 3 day supply:  [x] Yes  [] No      Artur Brand Rep   01/13/25 14:26 EST

## 2025-02-10 ENCOUNTER — OFFICE VISIT (OUTPATIENT)
Dept: INTERNAL MEDICINE | Facility: CLINIC | Age: 40
End: 2025-02-10
Payer: COMMERCIAL

## 2025-02-10 VITALS
SYSTOLIC BLOOD PRESSURE: 120 MMHG | HEIGHT: 68 IN | HEART RATE: 75 BPM | TEMPERATURE: 98 F | BODY MASS INDEX: 38.22 KG/M2 | WEIGHT: 252.2 LBS | OXYGEN SATURATION: 98 % | DIASTOLIC BLOOD PRESSURE: 70 MMHG

## 2025-02-10 DIAGNOSIS — S86.111A GASTROCNEMIUS TEAR, RIGHT, INITIAL ENCOUNTER: Primary | ICD-10-CM

## 2025-02-10 PROCEDURE — 99213 OFFICE O/P EST LOW 20 MIN: CPT | Performed by: FAMILY MEDICINE

## 2025-02-10 NOTE — PROGRESS NOTES
Date of Encounter: 02/10/2025  Patient: Jadon Hanson Jr.,  1985    Subjective   History of Presenting Illness  Chief complaint: Calf injury    Patient presents for recurrent right calf injury. he has been playing tennis and late last year he felt some discomfort in his right upper calf after playing.  This resolved fairly quickly.  He return to tennis and had a discrete injury with a pop sound with increased pain in the same region.  He recovered from this as well but had a subsequent injury with audible popping about 9 days ago.  He does not have any weakness in his right lower extremity.  He has not had any palpable lump or mass.  He does not have discomfort into his distal Achilles tendon.  He does wear appropriate shoes.  Wife is pregnant with their daughter expected in March.  He does want to return to playing tennis.  He has not been taking any medications for this. he does not have a history of this injury before.    The following portions of the patient's history were reviewed and updated as appropriate: allergies, current medications, past family history, past medical history, past social history, past surgical history and problem list.    Patient Active Problem List   Diagnosis    Hypothyroidism    Growth hormone deficiency    Deviated septum    Hyperlipidemia    Obesity, Class II, BMI 35-39.9     Past Medical History:   Diagnosis Date    Deviated septum     Growth hormone deficiency     Hyperlipidemia     Hypothyroidism      Past Surgical History:   Procedure Laterality Date    MOLE REMOVAL       Family History   Problem Relation Age of Onset    Diabetes Father     Food intolerance Mother        Current Outpatient Medications:     NP Thyroid 90 MG tablet, Take 1 tablet by mouth Daily., Disp: 90 tablet, Rfl: 3  No Known Allergies  Social History     Tobacco Use    Smoking status: Never     Passive exposure: Never    Smokeless tobacco: Never   Vaping Use    Vaping status: Never Used  "  Substance Use Topics    Alcohol use: Yes     Comment: Socially, about 6-20 drinks per week.      Drug use: Never          Objective   Physical Exam  Vitals:    02/10/25 1346   BP: 120/70   BP Location: Left arm   Patient Position: Sitting   Cuff Size: Large Adult   Pulse: 75   Temp: 98 °F (36.7 °C)   TempSrc: Infrared   SpO2: 98%   Weight: 114 kg (252 lb 3.2 oz)   Height: 172.7 cm (68\")     Body mass index is 38.35 kg/m².    Constitutional: NAD.  Psychiatric: Normal affect. Normal thought content.  Musculoskeletal: There is tenderness to palpation of the proximal right gastrocnemius medial greater than lateral.  No discrete deformity noted.  No appreciable mass or hematoma.  Granados test is normal.  There is no tenderness of the Achilles tendon distally.  No significant swelling of the leg.       Assessment & Plan   Assessment and Plan  Pleasant 39-year-old male with hyperlipidemia, hypothyroidism, history of growth hormone deficiency, BMI greater than 35, who presents for the following:     Diagnoses and all orders for this visit:    1. Gastrocnemius tear, right, initial encounter (Primary)  -     Ambulatory Referral to Physical Therapy for Evaluation & Treatment    Recommend physical therapy for recurrent gastrocnemius tear without significant weakness or palpable abnormality.  That being said, with his athletic status if his symptoms were to not improve or worsen would recommend imaging for further evaluation.  Would not return to tennis until cleared by physical therapy.  Discussed reasons to consider follow-up.    Diego Rincon MD  Family Medicine  O: [phone]    Disclaimer: Parts of this note were dictated by speech recognition. Minor errors in transcription may be present. Please call if questions.     "

## 2025-02-14 ENCOUNTER — HOSPITAL ENCOUNTER (OUTPATIENT)
Dept: PHYSICAL THERAPY | Facility: HOSPITAL | Age: 40
Setting detail: THERAPIES SERIES
Discharge: HOME | End: 2025-02-14
Payer: COMMERCIAL

## 2025-02-14 DIAGNOSIS — M79.661 RIGHT CALF PAIN: Primary | ICD-10-CM

## 2025-02-14 PROCEDURE — 97161 PT EVAL LOW COMPLEX 20 MIN: CPT

## 2025-02-14 NOTE — THERAPY EVALUATION
Outpatient Physical Therapy Ortho Initial Evaluation  [facility]     Patient Name: Jadon Hanson Jr.  : 1985  MRN: [id number]  Today's Date: 2025      Visit Date: 2025    Patient Active Problem List   Diagnosis    Hypothyroidism    Growth hormone deficiency    Deviated septum    Hyperlipidemia    Obesity, Class II, BMI 35-39.9        Past Medical History:   Diagnosis Date    Deviated septum     Growth hormone deficiency     Hyperlipidemia     Hypothyroidism         Past Surgical History:   Procedure Laterality Date    MOLE REMOVAL         Visit Dx:     ICD-10-CM ICD-9-CM   1. Right calf pain  M79.661 729.5          Patient History       Row Name 25 1300 25 0828          History    Chief Complaint Muscle weakness;Pain;Swelling;Tightness  -MO Muscle weakness;Pain;Swelling;Tightness (P)    -patient     Type of Pain Lower Extremity / Leg  -MO Lower Extremity / Leg (P)    -patient     Date Current Problem(s) Began 25  -MO 25 (P)    -patient     Brief Description of Current Complaint 39 y/o male pt reports to PT w/ acute R calf pain. Reports initial onset in November, R calf got a little tight and tender while playing tennis, hurt 2/10 that day, stretched it out, lingered for a while, took some time off. Went back to play about 2 months later and heard a pop. Finished out the game that day, pain went away ~1 week later. 2 weeks following that, later had an additional injury with an audible popping sound. That was ~2 weeks ago from this date. Had some mild localized bruising to the area for about day, took about 2 days before he could walk normally again. Generally was liking to play tennis about 1x per week, officiates lacrosse and other running spots, also weightlifts. Wife having baby in march, thinking its coming early. Chronic ankle sprains in early 20s. Currently very minimal pain, no real aggravating factors at this time. Just some mild soreness first thing in the  am. Not limiting day to day ambulation, not aggravated with steps. Wanting to address it so that it doesn't get worse in the future, wants to return to activity. No imagining completed.  -MO calf strain rt. calf, mild pull in November, 2 more with increasing severity in Jan and Feb (P)    -patient     Patient/Caregiver Goals Return to prior level of function;Know what to do to help the symptoms;Heal wound;Other (comment)  -MO Return to prior level of function;Know what to do to help the symptoms;Heal wound;Other (comment) (P)    -patient     Patient/Caregiver Goals Comment prevent future injury  -MO prevent future injury (P)    -patient     Hand Dominance right-handed  -MO right-handed (P)    -patient     Occupation/sports/leisure activities tennis, lacrosse officiating (running and sprinting), weightlifting  -MO tennis, lacrosse officiating (running and sprinting), weightlifting (P)    -patient     What clinical tests have you had for this problem? Other 1 (comment)  -MO Other 1 (comment) (P)    -patient     Additional Clinical Tests -- primary care evaluation, history and palpation (P)    -patient        Pain     Pain Location Ankle  -MO --     Pain at Present 0  -MO --     Pain at Best 2  -MO --     Pain at Worst 2  -MO --        Fall Risk Assessment    Any falls in the past year: No  -MO No (P)    -patient        Services    Prior Rehab/Home Health Experiences No  -MO No (P)    -patient     Are you currently receiving Home Health services No  -MO No (P)    -patient     Do you plan to receive Home Health services in the near future No  -MO No (P)    -patient        Daily Activities    Primary Language English  -MO English (P)    -patient     Are you able to read Yes  -MO Yes (P)    -patient     Are you able to write Yes  -MO Yes (P)    -patient     How does patient learn best? Listening;Reading;Demonstration;Pictures/Video  -MO Listening;Reading;Demonstration;Pictures/Video (P)    -patient        Safety    Are  you being hurt, hit, or frightened by anyone at home or in your life? No  -MO No (P)    -patient     Are you being neglected by a caregiver No  -MO No (P)    -patient     Have you had any of the following issues with N/A  -MO N/A (P)    -patient               User Key  (r) = Recorded By, (t) = Taken By, (c) = Cosigned By      Initials Name Provider Type    Cecy Arce, PT Physical Therapist    patient Jadon Davidsonregis Meeks --                     PT Ortho       Row Name 02/14/25 1300       Posture/Observations    Alignment Options Genu valgus;Foot pronation  -MO    Genu valgus Bilateral:;Mild;Increased;Standing posture  -MO    Foot pronation Bilateral:;Mild;Increased;Standing posture  -MO    Posture/Observations Comments in standing- BL genu valgus, ankle and forefoot pronation, loss of arch height bilaterally  -MO       Special Tests/Palpation    Special Tests/Palpation Ankle/Foot  -MO       Foot/Ankle Palpation    Foot/Ankle Palpation? Yes  -MO    Medial Gastroc Right:;Tender;Guarded/taut  -MO       Ankle/Foot Special Tests    Granados test (Achilles’ tendon rupture) Bilateral:;Negative  -MO       General ROM    RT Lower Ext Rt Ankle Dorsiflexion;Rt Ankle Plantarflexion;Rt Ankle Inversion;Rt Ankle Eversion  -MO    LT Lower Ext Lt Ankle Dorsiflexion;Lt Ankle Plantarflexion;Lt Ankle Inversion;Lt Ankle Eversion  -MO       Right Lower Ext    Rt Ankle Dorsiflexion AROM 2deg  -MO    Rt Ankle Dorsiflexion PROM 6  -MO    Rt Ankle Plantarflexion AROM 58  -MO       Left Lower Ext    Lt Ankle Dorsiflexion AROM to neutral  -MO    Lt Ankle Dorsiflexion PROM 8  -MO    Lt Ankle Plantarflexion AROM 60  -MO       MMT (Manual Muscle Testing)    Rt Lower Ext Rt Knee Extension;Rt Knee Flexion;Rt Ankle Plantarflexion;Rt Ankle Dorsiflexion;Rt Ankle Subtalar Inversion;Rt Ankle Subtalar Eversion  -MO    Lt Lower Ext Lt Knee Extension;Lt Knee Flexion;Lt Ankle Plantarflexion;Lt Ankle Dorsiflexion;Lt Ankle Subtalar Inversion;Lt Ankle  Subtalar Eversion  -MO       MMT Right Lower Ext    Rt Knee Extension MMT, Gross Movement (5/5) normal  -MO    Rt Knee Flexion MMT, Gross Movement (5/5) normal  -MO    Rt Ankle Plantarflexion MMT, Gross Movement (4/5) good  3 heel raises  -MO    Rt Ankle Dorsiflexion MMT, Gross Movement (5/5) normal  -MO    Rt Ankle Subtalar Inversion MT, Gross Movement (5/5) normal  -MO    Rt Ankle Subtalar Eversion MMT, Gross Movement (5/5) normal  -MO    Rt Lower Extremity Comments  5/5 great toe flex/ext  -MO       MMT Left Lower Ext    Lt Knee Extension MMT, Gross Movement (5/5) normal  -MO    Lt Knee Flexion MMT, Gross Movement (5/5) normal  -MO    Lt Ankle Plantarflexion MMT, Gross Movement (5/5) normal  SL raise : 6 prior to loss of height- able to complete 10  -MO    Lt Ankle Dorsiflexion MMT, Gross Movement (5/5) normal  -MO    Lt Ankle Subtalar Inversion MMT, Gross Movement (5/5) normal  -MO    Lt Ankle Subtalar Eversion MMT, Gross Movement (5/5) normal  -MO    Lt Lower Extremity Comments  5/5 great toe flex/ext  -MO       Sensation    Light Touch No apparent deficits  -MO              User Key  (r) = Recorded By, (t) = Taken By, (c) = Cosigned By      Initials Name Provider Type    Cecy Arce, PT Physical Therapist                                       PT OP Goals       Row Name 02/14/25 1400          PT Short Term Goals    STG Date to Achieve 03/16/25  -MO     STG 1 Pt will be independent and verbalized good understanding of initial HEP  -MO     STG 1 Progress New  -MO        Long Term Goals    LTG Date to Achieve 04/15/25  -MO     LTG 1 The pt will demonstrate IND and compliant with progressive HEP focused on IND condition management and return to PLOF.  -MO     LTG 1 Progress New  -MO     LTG 2 Pt will demo improved R PF strength to 5/5 w/ single leg HR test for return to recreational activity.  -MO     LTG 2 Progress New  -MO     LTG 3 Pt will demo ability to complete x15 fwd, lateral, and transverse bounding w/  RLE w/ appropriate form w/o onset of pain for return to higher level recreational activity  -MO     LTG 3 Progress New  -MO     LTG 4 Pt will improve subjective reports of pain via LEFS from 82% to >/=92% for improved QOL.  -MO     LTG 4 Progress New  -MO        Time Calculation    PT Goal Re-Cert Due Date 05/15/25  -MO               User Key  (r) = Recorded By, (t) = Taken By, (c) = Cosigned By      Initials Name Provider Type    Cecy Arce, PT Physical Therapist                     PT Assessment/Plan       Row Name 02/14/25 1700          PT Assessment    Functional Limitations Performance in leisure activities;Performance in sport activities;Limitations in functional capacity and performance  -MO     Impairments Muscle strength;Pain;Range of motion;Impaired flexibility  -MO     Assessment Comments Jadon Hanson Jr. (Rob) is a 39 y.o. male referred to physical therapy for acute R calf pain following tennis injury. He presents with a stable clinical presentation. Pt presents today with very low pain irritability. Mild/moderate tenderness isolated to proximal medial gastroc head, medial muscle belly and down shake, stopping proximal to achilles complex. Proximal and distal R LE grossly 5/5 with exception of plantar flexion strength at 4/5, tested via single leg heel raise. He has full active PF range, contracts muscle appropriately. However does additionally have onset of 3/10 pain/discomfort with testing, alleviates quickly with rest. BL DF ROM mildly restricted. In standing, he presents with BL mild genu valgus, increased ankle and forefoot pronation with reduced transverse arch bilaterally. No discoloration noted this date, no apparent muscle bulging or retraction noted, however R gastroc/soleus complex is notably tight with palpable taught tissue bands. (-) thompsons test. His signs and symptoms are most consistent with likely grade I-II gastroc, does not appear to have full tearing at this time.  Patients self reported LEFS outcome measure is 82%, ranging from 0-100 with 100% being normal function. He is limited in his return to higher level recreational activities at this time and will benefit from skilled PT to address the previous impairments and return to PLOF.  -MO     Please refer to paper survey for additional self-reported information No  -MO     Rehab Potential Good  -MO     Patient/caregiver participated in establishment of treatment plan and goals Yes  -MO     Patient would benefit from skilled therapy intervention Yes  -MO        PT Plan    PT Frequency 1x/week;2x/week  -MO     Predicted Duration of Therapy Intervention (PT) 5-8 visits  -MO     Planned CPT's? PT EVAL LOW COMPLEXITY: 42680;PT RE-EVAL: 66269;PT THER PROC EA 15 MIN: 61464;PT THER ACT EA 15 MIN: 41703;PT MANUAL THERAPY EA 15 MIN: 50089;PT NEUROMUSC RE-EDUCATION EA 15 MIN: 01239;PT GAIT TRAINING EA 15 MIN: 27803;PT SELF CARE/HOME MGMT/TRAIN EA 15: 91605  -MO     PT Plan Comments eliptical vs RCB warmup if tolerable. would likely benefit from STM to R medial gastroc, continue stretching. Short foot to build arch is seated, standing, or potentially on airex. Toe yoga, Straight leg and bent knee resisted DF likely blue or black band, BL HR, standing bent knee HR, lateral HR (LLE elevated on steps, RLE leg straigh w/ HR) TRX squat to HR, SLS w/ shoulder extension, double leg hopping or bounding  -MO               User Key  (r) = Recorded By, (t) = Taken By, (c) = Cosigned By      Initials Name Provider Type    Cecy Arce, PT Physical Therapist                       OP Exercises       Row Name 02/14/25 1300             Exercise 2    Exercise Name 2 runners gastroc / soleus stretch  -MO      Cueing 2 Verbal;Demo  -MO      Sets 2 1e R  -MO      Reps 2 10s  -MO      Time 2 cueing for neutral ankle  -MO         Exercise 3    Exercise Name 3 standing PF isometric at wall into ball  -MO      Cueing 3 Verbal;Demo  -MO      Reps 3 1R  -MO       Time 3 10s  -MO                User Key  (r) = Recorded By, (t) = Taken By, (c) = Cosigned By      Initials Name Provider Type    Cecy Arce, PT Physical Therapist                                            Time Calculation:     Start Time: 1315  Stop Time: 1355  Time Calculation (min): 40 min  Untimed Charges  PT Eval/Re-eval Minutes: 40  Total Minutes  Untimed Charges Total Minutes: 40   Total Minutes: 40     Therapy Charges for Today       Code Description Service Date Service Provider Modifiers Qty    36845168836 HC PT EVAL LOW COMPLEXITY 3 2/14/2025 Cecy Santoro, PT GP 1            PT G-Codes  Total: (Patient-Rptd) (P) 66         Cecy Santoro PT  2/14/2025

## 2025-02-18 ENCOUNTER — HOSPITAL ENCOUNTER (OUTPATIENT)
Dept: PHYSICAL THERAPY | Facility: HOSPITAL | Age: 40
Setting detail: THERAPIES SERIES
Discharge: HOME | End: 2025-02-18
Payer: COMMERCIAL

## 2025-02-18 DIAGNOSIS — M79.661 RIGHT CALF PAIN: Primary | ICD-10-CM

## 2025-02-18 PROCEDURE — 97110 THERAPEUTIC EXERCISES: CPT | Performed by: PHYSICAL THERAPIST

## 2025-02-18 PROCEDURE — 97140 MANUAL THERAPY 1/> REGIONS: CPT | Performed by: PHYSICAL THERAPIST

## 2025-02-18 NOTE — THERAPY TREATMENT NOTE
Outpatient Physical Therapy Ortho Treatment Note  [facility]     Patient Name: Jadon Hanson Jr.  : 1985  MRN: [id number]  Today's Date: 2025      Visit Date: 2025    Visit Dx:    ICD-10-CM ICD-9-CM   1. Right calf pain  M79.661 729.5       Patient Active Problem List   Diagnosis    Hypothyroidism    Growth hormone deficiency    Deviated septum    Hyperlipidemia    Obesity, Class II, BMI 35-39.9        Past Medical History:   Diagnosis Date    Deviated septum     Growth hormone deficiency     Hyperlipidemia     Hypothyroidism         Past Surgical History:   Procedure Laterality Date    MOLE REMOVAL                          PT Assessment/Plan       Row Name 25 1139          PT Assessment    Assessment Comments Mr. Hanson returns for his first follow up PT session for his R calf pain. Initiated STM to R medial > lateral gastroc tissues. He demonstrated mutliple taut bands of tissue particularly along the medial aspect of the gastroc tissue. Reviewed activity modifications and pacing of activities. Discussed anatomy of the gastroc/soleus tissues and physiology of healing ashley after multiple injuries to same tissues, including reaslitic expectations in terms of time frames/outcomes. discussed indications for and risks/benefits of DDN, he may be a candidate in the future. Mr. Hanson continues to be a good candidate for skilled physical therapy.  -GJ        PT Plan    PT Plan Comments assess response to STM to R medial gastroc tissue, warm up on eliptical, toe yoga, Straight leg and bent knee resisted DF likely blue or black band, BL HR, standing bent knee HR, lateral HR (LLE elevated on steps, RLE leg straigh w/ HR) TRX squat to HR, SLS w/ shoulder extension,  -SHEILA               User Key  (r) = Recorded By, (t) = Taken By, (c) = Cosigned By      Initials Name Provider Type    Eladio Childress, PT Physical Therapist                       OP Exercises       Row Name 25 6506  02/18/25 1100          Subjective    Subjective Comments -- my calf was a little tight when I got up, the tightness has gone down a little bit with the exercises  -GJ        Subjective Pain    Pre-Treatment Pain Level -- 1  -GJ        Total Minutes    09968 - PT Therapeutic Exercise Minutes 10  -GJ --     19189 - PT Manual Therapy Minutes 28  -GJ --        Exercise 1    Exercise Name 1 -- eliptical  -GJ     Time 1 -- 5 min  -GJ        Exercise 2    Exercise Name 2 -- runners gastroc / soleus stretch  -GJ     Cueing 2 -- Verbal;Demo  -GJ     Sets 2 -- 2 each R  -GJ     Reps 2 -- 20s  -GJ     Time 2 -- cueing for neutral ankle  -GJ     Additional Comments -- also demonstrated off step  -GJ               User Key  (r) = Recorded By, (t) = Taken By, (c) = Cosigned By      Initials Name Provider Type    Eladio Childress, PT Physical Therapist                             Manual Rx (Last 36 Hours)       Manual Treatments       Row Name 02/18/25 1135             Total Minutes    87686 - PT Manual Therapy Minutes 28  -GJ         Manual Rx 1    Manual Rx 1 Location STM to R medial > lateral gastroc/solues tissue  -GJ      Manual Rx 1 Type pt prone, ankles rested on bolster  -GJ                User Key  (r) = Recorded By, (t) = Taken By, (c) = Cosigned By      Initials Name Provider Type    Eladio Childress, PT Physical Therapist                     PT OP Goals       Row Name 02/18/25 1100          PT Short Term Goals    STG Date to Achieve 03/16/25  -GJ     STG 1 Pt will be independent and verbalized good understanding of initial HEP  -GJ     STG 1 Progress Ongoing  -GJ        Long Term Goals    LTG Date to Achieve 04/15/25  -GJ     LTG 1 The pt will demonstrate IND and compliant with progressive HEP focused on IND condition management and return to PLOF.  -GJ     LTG 1 Progress Ongoing  -GJ     LTG 2 Pt will demo improved R PF strength to 5/5 w/ single leg HR test for return to recreational activity.  -GJ     LTG 2  Progress Ongoing  -     LTG 3 Pt will demo ability to complete x15 fwd, lateral, and transverse bounding w/ RLE w/ appropriate form w/o onset of pain for return to higher level recreational activity  -GJ     LTG 3 Progress Ongoing  -GJ     LTG 4 Pt will improve subjective reports of pain via LEFS from 82% to >/=92% for improved QOL.  -     LTG 4 Progress Ongoing  -GJ               User Key  (r) = Recorded By, (t) = Taken By, (c) = Cosigned By      Initials Name Provider Type    Eladio Childress, PT Physical Therapist                    Therapy Education  Education Details: reviewed activity modifications and pacing of activities. Discussed anatomy of the gastroc/soleus tissues and physiology of healing ashley after multiple injuries to same tissues, including reaslitic expectations in terms of time frames/outcomes. discussed indications for and risks/benefits of DDN  Given: Symptoms/condition management, Pain management, Posture/body mechanics, Fall prevention and home safety, Mobility training  Program: Reinforced  How Provided: Verbal, Demonstration  Provided to: Patient  Level of Understanding: Teach back education performed, Verbalized, Demonstrated              Time Calculation:   Start Time: 1135  Stop Time: 1215  Time Calculation (min): 40 min  Timed Charges  88579 - PT Therapeutic Exercise Minutes: 10  36883 - PT Manual Therapy Minutes: 28  Total Minutes  Timed Charges Total Minutes: 38   Total Minutes: 38  Therapy Charges for Today       Code Description Service Date Service Provider Modifiers Qty    04984017237  PT MANUAL THERAPY EA 15 MIN 2/18/2025 Eladio Fuentes, PT GP 2    [id number] HC PT THER PROC EA 15 MIN 2/18/2025 Eladio Fuentes, PT GP 1                      Eladio Fuentes PT  2/18/2025

## 2025-02-27 ENCOUNTER — HOSPITAL ENCOUNTER (OUTPATIENT)
Dept: PHYSICAL THERAPY | Facility: HOSPITAL | Age: 40
Setting detail: THERAPIES SERIES
Discharge: HOME | End: 2025-02-27
Payer: COMMERCIAL

## 2025-02-27 DIAGNOSIS — M79.661 RIGHT CALF PAIN: Primary | ICD-10-CM

## 2025-02-27 PROCEDURE — 97110 THERAPEUTIC EXERCISES: CPT

## 2025-02-27 PROCEDURE — 97140 MANUAL THERAPY 1/> REGIONS: CPT

## 2025-02-27 NOTE — THERAPY TREATMENT NOTE
"  Outpatient Physical Therapy Ortho Treatment Note  [facility]     Patient Name: Jadon Hanson Jr.  : 1985  MRN: [id number]  Today's Date: 2025      Visit Date: 2025    Visit Dx:    ICD-10-CM ICD-9-CM   1. Right calf pain  M79.661 729.5       Patient Active Problem List   Diagnosis    Hypothyroidism    Growth hormone deficiency    Deviated septum    Hyperlipidemia    Obesity, Class II, BMI 35-39.9        Past Medical History:   Diagnosis Date    Deviated septum     Growth hormone deficiency     Hyperlipidemia     Hypothyroidism         Past Surgical History:   Procedure Laterality Date    MOLE REMOVAL                          PT Assessment/Plan       Row Name 25 0900          PT Assessment    Assessment Comments Alverto returns to PT reporting increased \"bruising sensation\" following last session due to manual therapy, he does report minimal relief following but does feel much more lose overall. Initiated calf, ankle, and foot strengthening this visit pt reporting some discomfort in his R calf during these activities but denies pain. Performed resisted DF to improve TA strength, not too much of a challenge but does report some stiffness in his R calf. Finsihed session w/ STM to R gastroc, pt reports some pain throughout but able to tolerate, after about 5 minutes he reports significant improvements due to decreased muscular tension.  -CS        PT Plan    PT Plan Comments Black TB for DF resistance,SLS w/ shoulder extension, foam HR?  -CS               User Key  (r) = Recorded By, (t) = Taken By, (c) = Cosigned By      Initials Name Provider Type    Mani Dougherty, PT Physical Therapist                       OP Exercises       Row Name 25 0900             Subjective    Subjective Comments It was pretty sore last time but its all good now  -CS         Subjective Pain    Able to rate subjective pain? yes  -CS      Pre-Treatment Pain Level 0  -CS      Post-Treatment Pain Level 0  -CS "         Total Minutes    39140 - PT Therapeutic Exercise Minutes 28  -CS      76783 - PT Manual Therapy Minutes 15  -CS         Exercise 1    Exercise Name 1 eliptical  -CS      Time 1 5 min  -CS         Exercise 2    Exercise Name 2 runners gastroc / soleus stretch  -CS      Cueing 2 Verbal;Demo  -CS      Sets 2 2 each R  -CS      Reps 2 20s  -CS      Time 2 cueing for neutral ankle  -CS         Exercise 3    Exercise Name 3 HR  -CS      Cueing 3 Verbal  -CS      Sets 3 1  -CS      Reps 3 20  -CS         Exercise 4    Exercise Name 4 Toe Yoga  -CS      Cueing 4 Verbal;Demo  -CS      Sets 4 1  -CS      Reps 4 15  -CS         Exercise 5    Exercise Name 5 Resisted DF  -CS      Cueing 5 Verbal  -CS      Sets 5 2 ea  -CS      Reps 5 10 ea  -CS      Time 5 BTB  -CS      Additional Comments LE ext, knee flexed  -CS         Exercise 6    Exercise Name 6 Bent knee HR  -CS      Cueing 6 Verbal;Demo  -CS      Sets 6 3  -CS      Reps 6 15  -CS         Exercise 7    Exercise Name 7 Lateral HR  -CS      Cueing 7 Verbal;Demo  -CS      Sets 7 2  -CS      Reps 7 10  -CS      Additional Comments LLE elevated on steps, RLE leg straigh w/ HR)  -CS         Exercise 8    Exercise Name 8 TRX squat to HR  -CS      Cueing 8 Verbal  -CS      Sets 8 2  -CS      Reps 8 10  -CS                User Key  (r) = Recorded By, (t) = Taken By, (c) = Cosigned By      Initials Name Provider Type    CS Mani Condon, PT Physical Therapist                             Manual Rx (Last 36 Hours)       Manual Treatments       Row Name 02/27/25 0900             Total Minutes    40139 - PT Manual Therapy Minutes 15  -CS         Manual Rx 1    Manual Rx 1 Location STM to R medial > lateral gastroc/solues tissue  -CS      Manual Rx 1 Type pt prone, ankles rested on bolster  -CS                User Key  (r) = Recorded By, (t) = Taken By, (c) = Cosigned By      Initials Name Provider Type    CS Mani Condon, PT Physical Therapist                     PT OP Goals        Row Name 02/27/25 0900          PT Short Term Goals    STG Date to Achieve 03/16/25  -CS     STG 1 Pt will be independent and verbalized good understanding of initial HEP  -CS     STG 1 Progress Ongoing  -CS        Long Term Goals    LTG Date to Achieve 04/15/25  -CS     LTG 1 The pt will demonstrate IND and compliant with progressive HEP focused on IND condition management and return to PLOF.  -CS     LTG 1 Progress Ongoing  -CS     LTG 2 Pt will demo improved R PF strength to 5/5 w/ single leg HR test for return to recreational activity.  -CS     LTG 2 Progress Ongoing  -CS     LTG 3 Pt will demo ability to complete x15 fwd, lateral, and transverse bounding w/ RLE w/ appropriate form w/o onset of pain for return to higher level recreational activity  -CS     LTG 3 Progress Ongoing  -CS     LTG 4 Pt will improve subjective reports of pain via LEFS from 82% to >/=92% for improved QOL.  -CS     LTG 4 Progress Ongoing  -CS               User Key  (r) = Recorded By, (t) = Taken By, (c) = Cosigned By      Initials Name Provider Type     Mani Condon PT Physical Therapist                    Therapy Education  Given: Symptoms/condition management, Pain management, Posture/body mechanics, Fall prevention and home safety, Mobility training  Program: Reinforced  How Provided: Verbal, Demonstration  Provided to: Patient  Level of Understanding: Teach back education performed, Verbalized, Demonstrated              Time Calculation:   Start Time: 0912  Stop Time: 0955  Time Calculation (min): 43 min  Timed Charges  41581 - PT Therapeutic Exercise Minutes: 28  87985 - PT Manual Therapy Minutes: 15  Total Minutes  Timed Charges Total Minutes: 43   Total Minutes: 43  Therapy Charges for Today       Code Description Service Date Service Provider Modifiers Qty    12577606642  PT THER PROC EA 15 MIN 2/27/2025 Mani Condon, PT GP 2    [id number] HC PT MANUAL THERAPY EA 15 MIN 2/27/2025 Mani Condon, PT GP 1                       Mani Condon, PT  2/27/2025

## 2025-03-03 ENCOUNTER — HOSPITAL ENCOUNTER (OUTPATIENT)
Dept: PHYSICAL THERAPY | Facility: HOSPITAL | Age: 40
Setting detail: THERAPIES SERIES
Discharge: HOME | End: 2025-03-03
Payer: COMMERCIAL

## 2025-03-03 DIAGNOSIS — M79.661 RIGHT CALF PAIN: Primary | ICD-10-CM

## 2025-03-03 PROCEDURE — 97110 THERAPEUTIC EXERCISES: CPT

## 2025-03-03 NOTE — THERAPY TREATMENT NOTE
Outpatient Physical Therapy Ortho Treatment Note  [facility]     Patient Name: Jadon Hanson Jr.  : 1985  MRN: [id number]  Today's Date: 3/3/2025      Visit Date: 2025    Visit Dx:    ICD-10-CM ICD-9-CM   1. Right calf pain  M79.661 729.5       Patient Active Problem List   Diagnosis    Hypothyroidism    Growth hormone deficiency    Deviated septum    Hyperlipidemia    Obesity, Class II, BMI 35-39.9        Past Medical History:   Diagnosis Date    Deviated septum     Growth hormone deficiency     Hyperlipidemia     Hypothyroidism         Past Surgical History:   Procedure Laterality Date    MOLE REMOVAL                          PT Assessment/Plan       Row Name 25 1300          PT Assessment    Assessment Comments Alverto returns today for physical therapy session without complaints of pain and without any increased in soreness at the last visit but did have a little bit of pain earlier today and over the weekend when he was up on it for a long time but did not have any effect on his day. He states that the pain has improved a lot but he feels like his strength is still not back normal. Continued with current exercise program with progressions as appropriate and as patient is able to tolerate. Added single leg RDLs, SLS with shoulder ext, SLS ball toss, heel raise on foam and soleus raise at the wall today to continue improving strength and stability of the gastro/soleus complex and knee/ankle joint. Required cues for proper form and technique during new activities today such as single leg RDLs and patient is able to correct. Pt has onset of muscular fatigue at the conclusion of the session but no increase in calf pain. Pt remains a good candidate for skilled PT services to address ongoing deficits.  -        PT Plan    PT Plan Comments Black TB for DF, calf raise on leg press (2 to 1?), step up  on BOSU, update HEP, more visits???  -               User Key  (r) = Recorded By, (t)  = Taken By, (c) = Cosigned By      Initials Name Provider Type    Lavell Liao, PT Physical Therapist                       OP Exercises       Row Name 03/03/25 1300             Subjective    Subjective Comments I had about a 1 pain earlier but its better now  -         Subjective Pain    Able to rate subjective pain? yes  -MH      Pre-Treatment Pain Level 0  -MH      Post-Treatment Pain Level 0  -MH         Total Minutes    91118 - PT Therapeutic Exercise Minutes 42  -MH         Exercise 1    Exercise Name 1 eliptical  -      Time 1 5 min  -MH         Exercise 2    Exercise Name 2 runners gastroc / soleus stretch  -MH      Cueing 2 Verbal;Demo  -      Sets 2 3 each R  -MH      Reps 2 20s  -MH      Time 2 cueing for neutral ankle  -MH         Exercise 3    Exercise Name 3 HR  -MH      Cueing 3 Verbal  -MH      Sets 3 1  -MH      Reps 3 20  -MH      Additional Comments On foam  -MH         Exercise 6    Exercise Name 6 Soleus raise at the wall  -      Cueing 6 Verbal;Demo  -      Sets 6 2  -MH      Reps 6 10  -MH      Additional Comments 2-3 sec eccentic lower  -MH         Exercise 8    Exercise Name 8 TRX squat to HR  -MH      Cueing 8 Verbal  -MH      Sets 8 3  -MH      Reps 8 10  -MH         Exercise 9    Exercise Name 9 SLS with ball toss  -      Cueing 9 Verbal;Demo  -      Sets 9 2  -MH      Reps 9 10e  -MH      Additional Comments 1 set on foam  -MH         Exercise 10    Exercise Name 10 SLS with shoulder ext  -MH      Cueing 10 Verbal;Demo  -MH      Sets 10 2  -MH      Reps 10 10  -MH         Exercise 11    Exercise Name 11 HR up 2 down with 1  -MH      Cueing 11 Tactile  -MH      Sets 11 2  -MH      Reps 11 15  -MH         Exercise 12    Exercise Name 12 Single leg RDL  -MH      Cueing 12 Verbal;Demo  -MH      Reps 12 2x10e  -MH      Additional Comments 10# KB  -MH                User Key  (r) = Recorded By, (t) = Taken By, (c) = Cosigned By      Initials Name Provider Type    CHERRY Fuentes  Lavell, PT Physical Therapist                                  PT OP Goals       Row Name 03/03/25 1300          PT Short Term Goals    STG Date to Achieve 03/16/25  -     STG 1 Pt will be independent and verbalized good understanding of initial HEP  -     STG 1 Progress Ongoing  [facility]        Long Term Goals    LTG Date to Achieve 04/15/25  -     LTG 1 The pt will demonstrate IND and compliant with progressive HEP focused on IND condition management and return to PLOF.  -     LTG 1 Progress Ongoing  -     LTG 2 Pt will demo improved R PF strength to 5/5 w/ single leg HR test for return to recreational activity.  -     LTG 2 Progress Ongoing  [facility]     LTG 3 Pt will demo ability to complete x15 fwd, lateral, and transverse bounding w/ RLE w/ appropriate form w/o onset of pain for return to higher level recreational activity  -     LTG 3 Progress Ongoing  -     LTG 4 Pt will improve subjective reports of pain via LEFS from 82% to >/=92% for improved QOL.  -     LT 4 Progress [facility]               User Key  (r) = Recorded By, (t) = Taken By, (c) = Cosigned By      Initials Name Provider Type     Lavell Fuentes, PT Physical Therapist                    Therapy Education  Given: Symptoms/condition management, Pain management, Posture/body mechanics  Program: Reinforced  How Provided: Verbal, Demonstration  Provided to: Patient  Level of Understanding: Teach back education performed, Verbalized, Demonstrated              Time Calculation:   Start Time: 1325  Stop Time: 1407  Time Calculation (min): 42 min  Timed Charges  20307 - PT Therapeutic Exercise Minutes: 42  Total Minutes  Timed Charges Total Minutes: 42   Total Minutes: 42  Therapy Charges for Today       Code Description Service Date Service Provider Modifiers Qty    03618073453 HC PT THER PROC EA 15 MIN 3/3/2025 Lavell Fuentes, PT GP 3                      Lavell Fuentes, PT  3/3/2025

## 2025-03-05 ENCOUNTER — HOSPITAL ENCOUNTER (OUTPATIENT)
Dept: PHYSICAL THERAPY | Facility: HOSPITAL | Age: 40
Setting detail: THERAPIES SERIES
Discharge: HOME | End: 2025-03-05
Payer: COMMERCIAL

## 2025-03-05 DIAGNOSIS — M79.661 RIGHT CALF PAIN: Primary | ICD-10-CM

## 2025-03-05 PROCEDURE — 97110 THERAPEUTIC EXERCISES: CPT

## 2025-03-05 NOTE — THERAPY TREATMENT NOTE
Outpatient Physical Therapy Ortho Treatment Note  [facility]     Patient Name: Jadon Hanson Jr.  : 1985  MRN: [id number]  Today's Date: 3/5/2025      Visit Date: 2025    Visit Dx:    ICD-10-CM ICD-9-CM   1. Right calf pain  M79.661 729.5       Patient Active Problem List   Diagnosis    Hypothyroidism    Growth hormone deficiency    Deviated septum    Hyperlipidemia    Obesity, Class II, BMI 35-39.9        Past Medical History:   Diagnosis Date    Deviated septum     Growth hormone deficiency     Hyperlipidemia     Hypothyroidism         Past Surgical History:   Procedure Laterality Date    MOLE REMOVAL                          PT Assessment/Plan       Row Name 25 1500          PT Assessment    Assessment Comments Alverto returns to PT today reporting tightness in R calf after last visit, but no significant pain. Continued previous exs today, with initiation of forward and lateral step ups to bosu to progress balance and stability of LE. Minimal cues required throughout visit for technique correction with overall good tolerance to today's visit. Updated full HEP with thorough review of each exercise and frequeny of completion. Pt verbalizes understanding. Pt remains appropriate for skilled PT at this time.  -        PT Plan    PT Plan Comments more visits (pts wife due 3/30/25)? auth? likely discharge next visit. see how pt did with updated HEP.  -               User Key  (r) = Recorded By, (t) = Taken By, (c) = Cosigned By      Initials Name Provider Type    Juan Jose Dickerson, PT Physical Therapist                       OP Exercises       Row Name 25 1400             Subjective    Subjective Comments I was pretty tight after last time.  -         Subjective Pain    Able to rate subjective pain? yes  -      Subjective Pain Comment arrival time 237 for 230 appt time  -         Total Minutes    32412 - PT Therapeutic Exercise Minutes 38  -         Exercise 1    Exercise  Name 1 eliptical  -DR      Time 1 5 min  -DR         Exercise 2    Exercise Name 2 runners gastroc / soleus stretch  -DR      Cueing 2 Verbal;Demo  -DR      Sets 2 3 each R  -DR      Reps 2 20s  -DR      Time 2 cueing for neutral ankle  -DR         Exercise 3    Exercise Name 3 HR  -DR      Cueing 3 Verbal  -DR      Sets 3 2  -DR      Reps 3 15  -DR      Time 3 on foam  -DR         Exercise 4    Exercise Name 4 step up with  to bosu  -DR      Cueing 4 Verbal;Demo  -DR      Sets 4 2R  -DR      Reps 4 10  -DR      Time 4 fwd/lateral  -DR         Exercise 6    Exercise Name 6 Soleus raise at the wall  -DR      Cueing 6 Verbal;Demo  -DR      Sets 6 2  -DR      Reps 6 15  -DR      Time 6 2-3 sec eccentic lower  -DR         Exercise 8    Exercise Name 8 TRX squat to HR  -DR      Cueing 8 Verbal  -DR      Sets 8 3  -DR      Reps 8 10  -DR         Exercise 10    Exercise Name 10 SLS with shoulder ext  -DR      Cueing 10 Verbal;Demo  -DR      Sets 10 2e  -DR      Reps 10 10  -DR      Time 10 blue TB  -DR         Exercise 11    Exercise Name 11 HR up 2 down with 1  -DR      Cueing 11 Tactile  -DR      Sets 11 2  -DR      Reps 11 15  -DR         Exercise 12    Exercise Name 12 Single leg RDL  -DR      Cueing 12 Verbal;Demo  -DR      Reps 12 2x10e  -DR      Time 12 10# KB  -DR                User Key  (r) = Recorded By, (t) = Taken By, (c) = Cosigned By      Initials Name Provider Type    Juan Jose Dickerson, PT Physical Therapist                                  PT OP Goals       Row Name 03/05/25 1400          PT Short Term Goals    STG Date to Achieve 03/16/25  -DR     STG 1 Pt will be independent and verbalized good understanding of initial HEP  -DR     STG 1 Progress Ongoing  -DR        Long Term Goals    LTG Date to Achieve 04/15/25  -DR     LTG 1 The pt will demonstrate IND and compliant with progressive HEP focused on IND condition management and return to PLOF.  -DR     LTG 1 Progress Ongoing  -DR     LTG 2 Pt  will demo improved R PF strength to 5/5 w/ single leg HR test for return to recreational activity.  -     LTG 2 Progress Ongoing  -     LTG 3 Pt will demo ability to complete x15 fwd, lateral, and transverse bounding w/ RLE w/ appropriate form w/o onset of pain for return to higher level recreational activity  -DR GUSMANG 3 Progress Ongoing  -     LTG 4 Pt will improve subjective reports of pain via LEFS from 82% to >/=92% for improved QOL.  -DR GUSMANG 4 Progress Ongoing  -               User Key  (r) = Recorded By, (t) = Taken By, (c) = Cosigned By      Initials Name Provider Type    Juan Jose Dickerson, PT Physical Therapist                    Therapy Education  Education Details: updated full HEP, discussesd potential discharge next visit pending compliance with HEP  Given: HEP, Symptoms/condition management, Pain management, Posture/body mechanics  Program: Reinforced, Progressed  How Provided: Verbal, Demonstration, Written  Provided to: Patient  Level of Understanding: Verbalized, Teach back education performed, Demonstrated              Time Calculation:   Start Time: 1438  Stop Time: 1516  Time Calculation (min): 38 min  Timed Charges  47405 - PT Therapeutic Exercise Minutes: 38  Total Minutes  Timed Charges Total Minutes: 38   Total Minutes: 38  Therapy Charges for Today       Code Description Service Date Service Provider Modifiers Qty    88637936735 HC PT THER PROC EA 15 MIN 3/5/2025 Juan Jose Roman, PT GP 3                      Juan Jose Roman, PT  3/5/2025

## 2025-03-11 ENCOUNTER — TRANSCRIBE ORDERS (OUTPATIENT)
Dept: PHYSICAL THERAPY | Facility: HOSPITAL | Age: 40
End: 2025-03-11
Payer: COMMERCIAL

## 2025-03-11 DIAGNOSIS — S86.111A GASTROCNEMIUS TEAR, RIGHT, INITIAL ENCOUNTER: Primary | ICD-10-CM

## 2025-03-12 ENCOUNTER — HOSPITAL ENCOUNTER (OUTPATIENT)
Dept: PHYSICAL THERAPY | Facility: HOSPITAL | Age: 40
Setting detail: THERAPIES SERIES
Discharge: HOME | End: 2025-03-12
Payer: COMMERCIAL

## 2025-03-12 DIAGNOSIS — M79.661 RIGHT CALF PAIN: Primary | ICD-10-CM

## 2025-03-12 PROCEDURE — 97110 THERAPEUTIC EXERCISES: CPT

## 2025-03-12 PROCEDURE — 97140 MANUAL THERAPY 1/> REGIONS: CPT

## 2025-03-13 NOTE — THERAPY PROGRESS REPORT/RE-CERT
"  Outpatient Physical Therapy Ortho Progress Note  [facility]     Patient Name: Jadon Hanson Jr.  : 1985  MRN: [id number]  Today's Date: 3/13/2025      Visit Date: 2025    Visit Dx:    ICD-10-CM ICD-9-CM   1. Right calf pain  M79.661 729.5       Patient Active Problem List   Diagnosis    Hypothyroidism    Growth hormone deficiency    Deviated septum    Hyperlipidemia    Obesity, Class II, BMI 35-39.9        Past Medical History:   Diagnosis Date    Deviated septum     Growth hormone deficiency     Hyperlipidemia     Hypothyroidism         Past Surgical History:   Procedure Laterality Date    MOLE REMOVAL                          PT Assessment/Plan       Row Name 25 1500          PT Assessment    Functional Limitations Performance in leisure activities;Performance in sport activities;Limitations in functional capacity and performance  -MP     Impairments Muscle strength;Pain;Range of motion;Impaired flexibility  -MP     Assessment Comments Jadon Hanson Jr. has been seen for 5 physical therapy sessions for acute R calf pain. Treatment has included therapeutic exercise, manual therapy, and patient education with home exercise program . Progress to physical therapy goals is fair as pt. Has met 1/1 STGs, and 0/4 LTGs. He reports initial benefit from manual but over last week or so has seemed plateau in progress in regards to \"tightness\" in R calf. He feels about 95% improvement but has yet to be able to return to sport and likely wont for several month as his wife is pregnant and due 3/30. He will benefit from continued skilled physical therapy to address remaining impairments and functional limitations.  -MP     Please refer to paper survey for additional self-reported information No  -MP     Rehab Potential Good  -MP     Patient/caregiver participated in establishment of treatment plan and goals Yes  -MP     Patient would benefit from skilled therapy intervention Yes  -MP        PT " Plan    PT Frequency 1x/week;2x/week  -MP     Predicted Duration of Therapy Intervention (PT) 6 visits  -MP     Planned CPT's? PT RE-EVAL: 97615;PT THER PROC EA 15 MIN: 23248;PT THER ACT EA 15 MIN: 06513;PT MANUAL THERAPY EA 15 MIN: 35134;PT NEUROMUSC RE-EDUCATION EA 15 MIN: 03822;PT EVAL AQUA: 87924;PT GAIT TRAINING EA 15 MIN: 41716;PT AQUATIC THERAPY EA 15 MIN: 01271;PT HOT OR COLD PACK TREAT MCARE;PT SELF CARE/HOME MGMT/TRAIN EA 15: 94361;PT ELECTRICAL STIM UNATTEND: ;PT ULTRASOUND EA 15 MIN: 28451  -MP     PT Plan Comments patients wife due 3/30; likely DC at next appointment  -MP               User Key  (r) = Recorded By, (t) = Taken By, (c) = Cosigned By      Initials Name Provider Type    Carolyn Ashton, PT Physical Therapist                       OP Exercises       Row Name 03/12/25 1500             Subjective    Subjective Comments It was pretty tight this week  -MP         Subjective Pain    Able to rate subjective pain? yes  -MP      Pre-Treatment Pain Level 0  -MP      Post-Treatment Pain Level 0  -MP      Subjective Pain Comment tightness  -MP         Total Minutes    54202 - PT Therapeutic Exercise Minutes 23  -MP      44466 - PT Manual Therapy Minutes 15  -MP         Exercise 1    Exercise Name 1 eliptical  -MP      Cueing 1 Verbal  -MP      Time 1 5 min  -MP         Exercise 2    Exercise Name 2 gastroc stretch off step  -MP      Cueing 2 Verbal;Demo  -MP      Sets 2 3  -MP      Reps 2 20sec  -MP         Exercise 8    Exercise Name 8 TRX squat to HR  -MP      Cueing 8 Verbal  -MP      Sets 8 3  -MP      Reps 8 10  -MP         Exercise 10    Exercise Name 10 SLS with shoulder ext  -MP      Cueing 10 Verbal;Demo  -MP      Sets 10 2e  -MP      Reps 10 10  -MP      Time 10 blue TB  -MP         Exercise 11    Exercise Name 11 HR up 2 down with 1  -MP      Cueing 11 Tactile  -MP      Sets 11 2  -MP      Reps 11 15  -MP      Time 11 off step  -MP                User Key  (r) = Recorded By, (t) =  Taken By, (c) = Cosigned By      Initials Name Provider Type    MP Carolyn Luciano, PT Physical Therapist                             Manual Rx (Last 36 Hours)       Manual Treatments       Row Name 03/12/25 1500             Total Minutes    12949 - PT Manual Therapy Minutes 15  -MP         Manual Rx 1    Manual Rx 1 Location STM to R medial > lateral gastroc/solues tissue  -MP      Manual Rx 1 Type pt prone, ankles rested on bolster  -MP                User Key  (r) = Recorded By, (t) = Taken By, (c) = Cosigned By      Initials Name Provider Type    MP Carolyn Luciano, PT Physical Therapist                     PT OP Goals       Row Name 03/12/25 1500          PT Short Term Goals    STG Date to Achieve 03/16/25  -MP     STG 1 Pt will be independent and verbalized good understanding of initial HEP  -MP     STG 1 Progress Met  -MP     STG 1 Progress Comments reports compliance  -MP        Long Term Goals    LTG Date to Achieve 04/15/25  -MP     LTG 1 The pt will demonstrate IND and compliant with progressive HEP focused on IND condition management and return to PLOF.  -MP     LTG 1 Progress Ongoing  -MP     LTG 2 Pt will demo improved R PF strength to 5/5 w/ single leg HR test for return to recreational activity.  -MP     LTG 2 Progress Ongoing  -MP     LTG 3 Pt will demo ability to complete x15 fwd, lateral, and transverse bounding w/ RLE w/ appropriate form w/o onset of pain for return to higher level recreational activity  -MP     LTG 3 Progress Ongoing  -MP     LTG 4 Pt will improve subjective reports of pain via LEFS from 82% to >/=92% for improved QOL.  -MP     LTG 4 Progress Ongoing  -MP               User Key  (r) = Recorded By, (t) = Taken By, (c) = Cosigned By      Initials Name Provider Type    MP Carolyn Luciano, PT Physical Therapist                    Therapy Education  Given: Symptoms/condition management, Posture/body mechanics  Program: Reinforced  How Provided: Verbal, Demonstration  Provided to:  Patient  Level of Understanding: Verbalized, Demonstrated              Time Calculation:   Start Time: 1520  Stop Time: 1558  Time Calculation (min): 38 min  Total Timed Code Minutes- PT: 38 minute(s)  Timed Charges  93311 - PT Therapeutic Exercise Minutes: 23  17793 - PT Manual Therapy Minutes: 15  Total Minutes  Timed Charges Total Minutes: 38   Total Minutes: 38  Therapy Charges for Today       Code Description Service Date Service Provider Modifiers Qty    51269259574 HC PT MANUAL THERAPY EA 15 MIN 3/12/2025 Carolyn Luciano, PT GP 1    [id number]  PT THER PROC EA 15 MIN 3/12/2025 Carolyn Luciano, PT GP 2                      Carolyn Luciano, PT  3/13/2025

## 2025-03-19 ENCOUNTER — HOSPITAL ENCOUNTER (OUTPATIENT)
Dept: PHYSICAL THERAPY | Facility: HOSPITAL | Age: 40
Discharge: HOME | End: 2025-03-19
Admitting: FAMILY MEDICINE
Payer: COMMERCIAL

## 2025-03-19 DIAGNOSIS — M79.661 RIGHT CALF PAIN: Primary | ICD-10-CM

## 2025-03-19 PROCEDURE — 97110 THERAPEUTIC EXERCISES: CPT | Performed by: PHYSICAL THERAPIST

## 2025-03-19 NOTE — THERAPY TREATMENT NOTE
Outpatient Physical Therapy Ortho Treatment Note  [facility]     Patient Name: Jadon Hanson Jr.  : 1985  MRN: [id number]  Today's Date: 3/19/2025      Visit Date: 2025    Visit Dx:    ICD-10-CM ICD-9-CM   1. Right calf pain  M79.661 729.5       Patient Active Problem List   Diagnosis    Hypothyroidism    Growth hormone deficiency    Deviated septum    Hyperlipidemia    Obesity, Class II, BMI 35-39.9        Past Medical History:   Diagnosis Date    Deviated septum     Growth hormone deficiency     Hyperlipidemia     Hypothyroidism         Past Surgical History:   Procedure Laterality Date    MOLE REMOVAL                          PT Assessment/Plan       Row Name 25 1543          PT Assessment    Assessment Comments Mr. Hanson returns to the clinic for his right calf pain.  He reports slight irritation in his right calf pain after being in a squatted position for prolonged amount of time yesterday.  He reports today will likely days last day pending the birth of his child.  He reports that he will practice independent management for some time and call when he is ready return if he needs to return.  He is encouraged to continue HEP 2-3 times per week.  We discussed activity modification and pacing of activities including appropriate pace and return to premorbid activities such as tennis and pickleball.  Answered questions.  He did demonstrate rapid fatigue of right triceps surae tissue during eccentric loading activities.  He is encouraged to call with any questions or concerns or when he is ready to return to the clinic.  At this time he will transition to trial self-management.  -SHEILA        PT Plan    PT Plan Comments Patient to trial independent management of condition at this time.  He will call when he is ready to return to the clinic.  -SHEILA               User Key  (r) = Recorded By, (t) = Taken By, (c) = Cosigned By      Initials Name Provider Type    Eladio Childress, PT Physical  Therapist                       OP Exercises       Row Name 03/19/25 1407 03/19/25 1400          Subjective    Subjective Comments -- was painting a cabinet yesterday (in squatted position for prolonged period of time)  -GJ        Subjective Pain    Pre-Treatment Pain Level -- 1  -GJ        Total Minutes    05366 - PT Therapeutic Exercise Minutes 38  -GJ --        Exercise 1    Exercise Name 1 -- eliptical  -GJ     Cueing 1 -- Verbal  -GJ     Time 1 -- 5 min  -GJ        Exercise 8    Exercise Name 8 -- TRX squat to HR  -GJ     Cueing 8 -- Verbal  -GJ     Sets 8 -- 3  -GJ     Reps 8 -- 10  -GJ        Exercise 10    Exercise Name 10 -- SLS with shoulder ext  -GJ     Cueing 10 -- Verbal;Demo  -GJ     Sets 10 -- 2e  -GJ     Reps 10 -- 10  -GJ     Time 10 -- blue TB  -GJ        Exercise 11    Exercise Name 11 -- HR up 2 down with 1  -GJ     Cueing 11 -- Tactile  -GJ     Sets 11 -- 2  -GJ     Reps 11 -- 10  -GJ     Time 11 -- off step  -GJ     Additional Comments -- second set, only able to do 7  -GJ        Exercise 13    Exercise Name 13 -- lateral stepping, mild flexion in knee/on toes  -GJ     Cueing 13 -- Verbal;Demo  -GJ     Sets 13 -- 2  -GJ     Reps 13 -- 3 laps  -GJ     Time 13 -- GTB  -GJ               User Key  (r) = Recorded By, (t) = Taken By, (c) = Cosigned By      Initials Name Provider Type    GJ Eladio Fuentes W, PT Physical Therapist                                  PT OP Goals       Row Name 03/19/25 1500          PT Short Term Goals    STG Date to Achieve 03/16/25  -GJ     STG 1 Pt will be independent and verbalized good understanding of initial HEP  -GJ     STG 1 Progress Met  -GJ        Long Term Goals    LTG Date to Achieve 04/15/25  -GJ     LTG 1 The pt will demonstrate IND and compliant with progressive HEP focused on IND condition management and return to PLOF.  -GJ     LTG 1 Progress Ongoing  -GJ     LTG 2 Pt will demo improved R PF strength to 5/5 w/ single leg HR test for return to recreational  activity.  -GJ     LTG 2 Progress Ongoing  -GJ     LTG 3 Pt will demo ability to complete x15 fwd, lateral, and transverse bounding w/ RLE w/ appropriate form w/o onset of pain for return to higher level recreational activity  -GJ     LTG 3 Progress Ongoing  -GJ     LTG 4 Pt will improve subjective reports of pain via LEFS from 82% to >/=92% for improved QOL.  -GJ     LTG 4 Progress Ongoing  -GJ               User Key  (r) = Recorded By, (t) = Taken By, (c) = Cosigned By      Initials Name Provider Type    Eladio Childress, PT Physical Therapist                    Therapy Education  Education Details: reviewed activity modifications discussed pacing activities.  Discussed slow return to premorbid activities.  Discussed importance of preactivity warm up to facilitate optimal tissue accommodation to stresses of power activities.  Encourage patient to continue his HEP 2-3 times per week.  Encourage patient to call with questions or when he is ready to return to the clinic  Given: HEP, Symptoms/condition management, Pain management, Posture/body mechanics, Fall prevention and home safety, Mobility training  Program: Reinforced  How Provided: Verbal, Demonstration  Provided to: Patient  Level of Understanding: Teach back education performed, Verbalized, Demonstrated              Time Calculation:   Start Time: 1405  Stop Time: 1443  Time Calculation (min): 38 min  Timed Charges  94387 - PT Therapeutic Exercise Minutes: 38  Total Minutes  Timed Charges Total Minutes: 38   Total Minutes: 38  Therapy Charges for Today       Code Description Service Date Service Provider Modifiers Qty    [id number]  PT THER PROC EA 15 MIN 3/19/2025 Eladio Fuentes, PT GP 3                      Eladio Fuentes PT  3/19/2025

## 2025-08-12 ENCOUNTER — OFFICE VISIT (OUTPATIENT)
Dept: INTERNAL MEDICINE | Facility: CLINIC | Age: 40
End: 2025-08-12
Payer: COMMERCIAL

## 2025-08-12 VITALS
OXYGEN SATURATION: 100 % | HEART RATE: 99 BPM | SYSTOLIC BLOOD PRESSURE: 122 MMHG | WEIGHT: 258.8 LBS | BODY MASS INDEX: 39.22 KG/M2 | DIASTOLIC BLOOD PRESSURE: 78 MMHG | HEIGHT: 68 IN

## 2025-08-12 DIAGNOSIS — E78.5 HYPERLIPIDEMIA, UNSPECIFIED HYPERLIPIDEMIA TYPE: ICD-10-CM

## 2025-08-12 DIAGNOSIS — Z11.59 NEED FOR HEPATITIS C SCREENING TEST: ICD-10-CM

## 2025-08-12 DIAGNOSIS — E23.0 GROWTH HORMONE DEFICIENCY: ICD-10-CM

## 2025-08-12 DIAGNOSIS — Z00.00 ANNUAL PHYSICAL EXAM: Primary | ICD-10-CM

## 2025-08-12 DIAGNOSIS — E03.9 HYPOTHYROIDISM, UNSPECIFIED TYPE: ICD-10-CM

## 2025-08-12 PROCEDURE — 99395 PREV VISIT EST AGE 18-39: CPT | Performed by: STUDENT IN AN ORGANIZED HEALTH CARE EDUCATION/TRAINING PROGRAM

## 2025-08-14 ENCOUNTER — PATIENT ROUNDING (BHMG ONLY) (OUTPATIENT)
Dept: INTERNAL MEDICINE | Facility: CLINIC | Age: 40
End: 2025-08-14
Payer: COMMERCIAL

## 2025-08-22 LAB
ALBUMIN SERPL-MCNC: 4.6 G/DL (ref 3.5–5.2)
ALBUMIN/GLOB SERPL: 1.7 G/DL
ALP SERPL-CCNC: 64 U/L (ref 39–117)
ALT SERPL-CCNC: 49 U/L (ref 1–41)
AST SERPL-CCNC: 29 U/L (ref 1–40)
BASOPHILS # BLD AUTO: 0.03 10*3/MM3 (ref 0–0.2)
BASOPHILS NFR BLD AUTO: 0.4 % (ref 0–1.5)
BILIRUB SERPL-MCNC: 0.4 MG/DL (ref 0–1.2)
BUN SERPL-MCNC: 16 MG/DL (ref 6–20)
BUN/CREAT SERPL: 17 (ref 7–25)
CALCIUM SERPL-MCNC: 9.2 MG/DL (ref 8.6–10.5)
CHLORIDE SERPL-SCNC: 105 MMOL/L (ref 98–107)
CHOLEST SERPL-MCNC: 184 MG/DL (ref 0–200)
CO2 SERPL-SCNC: 24 MMOL/L (ref 22–29)
CREAT SERPL-MCNC: 0.94 MG/DL (ref 0.76–1.27)
EGFRCR SERPLBLD CKD-EPI 2021: 107.1 ML/MIN/1.73
EOSINOPHIL # BLD AUTO: 0.3 10*3/MM3 (ref 0–0.4)
EOSINOPHIL NFR BLD AUTO: 4.5 % (ref 0.3–6.2)
ERYTHROCYTE [DISTWIDTH] IN BLOOD BY AUTOMATED COUNT: 12.9 % (ref 12.3–15.4)
GLOBULIN SER CALC-MCNC: 2.7 GM/DL
GLUCOSE SERPL-MCNC: 100 MG/DL (ref 65–99)
HBA1C MFR BLD: 5.1 % (ref 4.8–5.6)
HCT VFR BLD AUTO: 45 % (ref 37.5–51)
HCV IGG SERPL QL IA: NON REACTIVE
HDLC SERPL-MCNC: 44 MG/DL (ref 40–60)
HGB BLD-MCNC: 14.9 G/DL (ref 13–17.7)
IMM GRANULOCYTES # BLD AUTO: 0.02 10*3/MM3 (ref 0–0.05)
IMM GRANULOCYTES NFR BLD AUTO: 0.3 % (ref 0–0.5)
LDLC SERPL CALC-MCNC: 129 MG/DL (ref 0–100)
LDLC/HDLC SERPL: 2.92 {RATIO}
LYMPHOCYTES # BLD AUTO: 2.13 10*3/MM3 (ref 0.7–3.1)
LYMPHOCYTES NFR BLD AUTO: 31.9 % (ref 19.6–45.3)
MCH RBC QN AUTO: 29.4 PG (ref 26.6–33)
MCHC RBC AUTO-ENTMCNC: 33.1 G/DL (ref 31.5–35.7)
MCV RBC AUTO: 88.8 FL (ref 79–97)
MONOCYTES # BLD AUTO: 0.57 10*3/MM3 (ref 0.1–0.9)
MONOCYTES NFR BLD AUTO: 8.5 % (ref 5–12)
NEUTROPHILS # BLD AUTO: 3.63 10*3/MM3 (ref 1.7–7)
NEUTROPHILS NFR BLD AUTO: 54.4 % (ref 42.7–76)
NRBC BLD AUTO-RTO: 0 /100 WBC (ref 0–0.2)
PLATELET # BLD AUTO: 266 10*3/MM3 (ref 140–450)
POTASSIUM SERPL-SCNC: 4.6 MMOL/L (ref 3.5–5.2)
PROT SERPL-MCNC: 7.3 G/DL (ref 6–8.5)
RBC # BLD AUTO: 5.07 10*6/MM3 (ref 4.14–5.8)
SODIUM SERPL-SCNC: 139 MMOL/L (ref 136–145)
T3FREE SERPL-MCNC: 4.2 PG/ML (ref 2–4.4)
T3REVERSE SERPL-MCNC: 14.8 NG/DL (ref 9.2–24.1)
T4 FREE SERPL-MCNC: 1.04 NG/DL (ref 0.92–1.68)
TRIGL SERPL-MCNC: 58 MG/DL (ref 0–150)
TSH SERPL DL<=0.005 MIU/L-ACNC: 1.05 UIU/ML (ref 0.27–4.2)
VLDLC SERPL CALC-MCNC: 11 MG/DL (ref 5–40)
WBC # BLD AUTO: 6.68 10*3/MM3 (ref 3.4–10.8)